# Patient Record
Sex: MALE | Race: WHITE | URBAN - METROPOLITAN AREA
[De-identification: names, ages, dates, MRNs, and addresses within clinical notes are randomized per-mention and may not be internally consistent; named-entity substitution may affect disease eponyms.]

---

## 2018-02-01 ENCOUNTER — HISTORICAL (OUTPATIENT)
Dept: ADMINISTRATIVE | Facility: HOSPITAL | Age: 68
End: 2018-02-01

## 2018-02-02 LAB — GRAM STN SPEC: NORMAL

## 2018-02-04 LAB — FINAL CULTURE: NORMAL

## 2018-03-05 LAB — FINAL CULTURE: NORMAL

## 2018-10-15 ENCOUNTER — HISTORICAL (OUTPATIENT)
Dept: ADMINISTRATIVE | Facility: HOSPITAL | Age: 68
End: 2018-10-15

## 2018-10-15 LAB
ABS NEUT (OLG): 5.39 X10(3)/MCL (ref 2.1–9.2)
ALBUMIN SERPL-MCNC: 2.8 GM/DL (ref 3.4–5)
ALBUMIN/GLOB SERPL: 1 RATIO (ref 1.1–2)
ALP SERPL-CCNC: 122 UNIT/L (ref 50–136)
ALT SERPL-CCNC: 36 UNIT/L (ref 12–78)
APTT PPP: 50.7 SECOND(S) (ref 24.8–36.9)
AST SERPL-CCNC: 18 UNIT/L (ref 15–37)
BASOPHILS # BLD AUTO: 0.1 X10(3)/MCL (ref 0–0.2)
BASOPHILS NFR BLD AUTO: 1 %
BILIRUB SERPL-MCNC: 0.7 MG/DL (ref 0.2–1)
BILIRUBIN DIRECT+TOT PNL SERPL-MCNC: 0.2 MG/DL (ref 0–0.5)
BILIRUBIN DIRECT+TOT PNL SERPL-MCNC: 0.5 MG/DL (ref 0–0.8)
BUN SERPL-MCNC: 36 MG/DL (ref 7–18)
CALCIUM SERPL-MCNC: 8 MG/DL (ref 8.5–10.1)
CHLORIDE SERPL-SCNC: 115 MMOL/L (ref 98–107)
CO2 SERPL-SCNC: 23 MMOL/L (ref 21–32)
CREAT SERPL-MCNC: 1.91 MG/DL (ref 0.7–1.3)
EOSINOPHIL # BLD AUTO: 0.4 X10(3)/MCL (ref 0–0.9)
EOSINOPHIL NFR BLD AUTO: 5 %
ERYTHROCYTE [DISTWIDTH] IN BLOOD BY AUTOMATED COUNT: 15.7 % (ref 11.5–17)
FERRITIN SERPL-MCNC: 403.7 NG/ML (ref 8–388)
GLOBULIN SER-MCNC: 2.9 GM/DL (ref 2.4–3.5)
GLUCOSE SERPL-MCNC: 237 MG/DL (ref 74–106)
HCT VFR BLD AUTO: 34.8 % (ref 42–52)
HGB BLD-MCNC: 10.9 GM/DL (ref 14–18)
INR PPP: 1.26 (ref 0–1.27)
LYMPHOCYTES # BLD AUTO: 1.8 X10(3)/MCL (ref 0.6–4.6)
LYMPHOCYTES NFR BLD AUTO: 21 %
MCH RBC QN AUTO: 27.2 PG (ref 27–31)
MCHC RBC AUTO-ENTMCNC: 31.3 GM/DL (ref 33–36)
MCV RBC AUTO: 86.8 FL (ref 80–94)
MONOCYTES # BLD AUTO: 1 X10(3)/MCL (ref 0.1–1.3)
MONOCYTES NFR BLD AUTO: 11 %
NEUTROPHILS # BLD AUTO: 5.39 X10(3)/MCL (ref 2.1–9.2)
NEUTROPHILS NFR BLD AUTO: 61 %
PLATELET # BLD AUTO: 161 X10(3)/MCL (ref 130–400)
PMV BLD AUTO: 10.8 FL (ref 9.4–12.4)
POTASSIUM SERPL-SCNC: 5.1 MMOL/L (ref 3.5–5.1)
PROT SERPL-MCNC: 5.7 GM/DL (ref 6.4–8.2)
PROTHROMBIN TIME: 16.2 SECOND(S) (ref 12.2–14.7)
RBC # BLD AUTO: 4.01 X10(6)/MCL (ref 4.7–6.1)
RET# (OHS): 0.05 X10^6/ML (ref 0.03–0.1)
RETICULOCYTE COUNT AUTOMATED (OLG): 1.3 % (ref 1.1–2.1)
SODIUM SERPL-SCNC: 143 MMOL/L (ref 136–145)
WBC # SPEC AUTO: 8.8 X10(3)/MCL (ref 4.5–11.5)

## 2019-02-11 ENCOUNTER — HOSPITAL ENCOUNTER (INPATIENT)
Facility: HOSPITAL | Age: 69
LOS: 4 days | Discharge: HOME HEALTH | DRG: 070 | End: 2019-02-15
Attending: PSYCHIATRY & NEUROLOGY | Admitting: PSYCHIATRY & NEUROLOGY
Payer: MEDICARE

## 2019-02-11 DIAGNOSIS — I10 HTN (HYPERTENSION): ICD-10-CM

## 2019-02-11 DIAGNOSIS — E11.65 TYPE 2 DIABETES MELLITUS WITH HYPERGLYCEMIA, WITHOUT LONG-TERM CURRENT USE OF INSULIN: Primary | ICD-10-CM

## 2019-02-11 DIAGNOSIS — R41.82 ALTERED MENTAL STATUS: ICD-10-CM

## 2019-02-11 PROBLEM — E11.9 DM (DIABETES MELLITUS): Status: ACTIVE | Noted: 2019-02-11

## 2019-02-11 PROBLEM — G93.40 ENCEPHALOPATHY ACUTE: Status: ACTIVE | Noted: 2019-02-11

## 2019-02-11 PROBLEM — T17.908A ASPIRATION INTO RESPIRATORY TRACT: Status: ACTIVE | Noted: 2019-02-11

## 2019-02-11 PROBLEM — I67.83 POSTERIOR REVERSIBLE ENCEPHALOPATHY SYNDROME: Status: ACTIVE | Noted: 2019-02-11

## 2019-02-11 PROBLEM — J96.01 ACUTE RESPIRATORY FAILURE WITH HYPOXIA: Status: ACTIVE | Noted: 2019-02-11

## 2019-02-11 PROBLEM — K72.90 LIVER FAILURE: Status: ACTIVE | Noted: 2019-02-11

## 2019-02-11 PROBLEM — G93.6 VASOGENIC BRAIN EDEMA: Status: ACTIVE | Noted: 2019-02-11

## 2019-02-11 LAB
ABO + RH BLD: NORMAL
ALBUMIN SERPL BCP-MCNC: 2.8 G/DL
ALLENS TEST: ABNORMAL
ALP SERPL-CCNC: 155 U/L
ALT SERPL W/O P-5'-P-CCNC: 28 U/L
AMMONIA PLAS-SCNC: 42 UMOL/L
ANION GAP SERPL CALC-SCNC: 12 MMOL/L
ANISOCYTOSIS BLD QL SMEAR: SLIGHT
ASCENDING AORTA: 2.69 CM
AST SERPL-CCNC: 33 U/L
AV INDEX (PROSTH): 0.99
AV MEAN GRADIENT: 2.73 MMHG
AV PEAK GRADIENT: 4.75 MMHG
AV VALVE AREA: 3.88 CM2
AV VELOCITY RATIO: 0.94
BACTERIA #/AREA URNS AUTO: ABNORMAL /HPF
BASOPHILS # BLD AUTO: 0.09 K/UL
BASOPHILS NFR BLD: 0.4 %
BILIRUB SERPL-MCNC: 1 MG/DL
BILIRUB UR QL STRIP: NEGATIVE
BLD GP AB SCN CELLS X3 SERPL QL: NORMAL
BSA FOR ECHO PROCEDURE: 2.02 M2
BUN SERPL-MCNC: 40 MG/DL
CALCIUM SERPL-MCNC: 8.8 MG/DL
CHLORIDE SERPL-SCNC: 110 MMOL/L
CHOLEST SERPL-MCNC: 105 MG/DL
CHOLEST/HDLC SERPL: 3.6 {RATIO}
CLARITY UR REFRACT.AUTO: ABNORMAL
CO2 SERPL-SCNC: 16 MMOL/L
COLOR UR AUTO: YELLOW
CREAT SERPL-MCNC: 2.1 MG/DL
CV ECHO LV RWT: 0.49 CM
DELSYS: ABNORMAL
DIFFERENTIAL METHOD: ABNORMAL
DOP CALC AO PEAK VEL: 1.09 M/S
DOP CALC AO VTI: 13.75 CM
DOP CALC LVOT AREA: 3.94 CM2
DOP CALC LVOT DIAMETER: 2.24 CM
DOP CALC LVOT PEAK VEL: 1.03 M/S
DOP CALC LVOT STROKE VOLUME: 53.41 CM3
DOP CALCLVOT PEAK VEL VTI: 13.56 CM
E WAVE DECELERATION TIME: 289.98 MSEC
E/A RATIO: 0.68
E/E' RATIO: 14.29
ECHO LV POSTERIOR WALL: 0.93 CM (ref 0.6–1.1)
EOSINOPHIL # BLD AUTO: 0 K/UL
EOSINOPHIL NFR BLD: 0.1 %
ERYTHROCYTE [DISTWIDTH] IN BLOOD BY AUTOMATED COUNT: 15.6 %
ERYTHROCYTE [SEDIMENTATION RATE] IN BLOOD BY WESTERGREN METHOD: 14 MM/H
EST. GFR  (AFRICAN AMERICAN): 36.8 ML/MIN/1.73 M^2
EST. GFR  (NON AFRICAN AMERICAN): 31.8 ML/MIN/1.73 M^2
ESTIMATED AVG GLUCOSE: 189 MG/DL
FIO2: 50
FRACTIONAL SHORTENING: 7 % (ref 28–44)
GLUCOSE SERPL-MCNC: 429 MG/DL
GLUCOSE UR QL STRIP: ABNORMAL
HBA1C MFR BLD HPLC: 8.2 %
HCO3 UR-SCNC: 19.3 MMOL/L (ref 24–28)
HCT VFR BLD AUTO: 42.4 %
HDLC SERPL-MCNC: 29 MG/DL
HDLC SERPL: 27.6 %
HGB BLD-MCNC: 14.2 G/DL
HGB UR QL STRIP: ABNORMAL
HYALINE CASTS UR QL AUTO: 0 /LPF
HYPOCHROMIA BLD QL SMEAR: ABNORMAL
IMM GRANULOCYTES # BLD AUTO: 0.8 K/UL
IMM GRANULOCYTES NFR BLD AUTO: 3.4 %
INTERVENTRICULAR SEPTUM: 0.76 CM (ref 0.6–1.1)
KETONES UR QL STRIP: NEGATIVE
LA MAJOR: 5.31 CM
LA MINOR: 5.21 CM
LA WIDTH: 4.02 CM
LDLC SERPL CALC-MCNC: 49 MG/DL
LEFT ATRIUM SIZE: 4.35 CM
LEFT ATRIUM VOLUME INDEX: 40 ML/M2
LEFT ATRIUM VOLUME: 78.18 CM3
LEFT INTERNAL DIMENSION IN SYSTOLE: 3.54 CM (ref 2.1–4)
LEFT VENTRICLE DIASTOLIC VOLUME INDEX: 31.86 ML/M2
LEFT VENTRICLE DIASTOLIC VOLUME: 62.31 ML
LEFT VENTRICLE MASS INDEX: 47.6 G/M2
LEFT VENTRICLE SYSTOLIC VOLUME INDEX: 26.6 ML/M2
LEFT VENTRICLE SYSTOLIC VOLUME: 52.12 ML
LEFT VENTRICULAR INTERNAL DIMENSION IN DIASTOLE: 3.81 CM (ref 3.5–6)
LEFT VENTRICULAR MASS: 93.01 G
LEUKOCYTE ESTERASE UR QL STRIP: NEGATIVE
LV LATERAL E/E' RATIO: 12.5
LV SEPTAL E/E' RATIO: 16.67
LYMPHOCYTES # BLD AUTO: 1.8 K/UL
LYMPHOCYTES NFR BLD: 7.6 %
MAGNESIUM SERPL-MCNC: 1.2 MG/DL
MCH RBC QN AUTO: 29.3 PG
MCHC RBC AUTO-ENTMCNC: 33.5 G/DL
MCV RBC AUTO: 87 FL
MICROSCOPIC COMMENT: ABNORMAL
MIN VOL: 10
MODE: ABNORMAL
MONOCYTES # BLD AUTO: 2.8 K/UL
MONOCYTES NFR BLD: 12.2 %
MV PEAK A VEL: 1.47 M/S
MV PEAK E VEL: 1 M/S
NEUTROPHILS # BLD AUTO: 17.7 K/UL
NEUTROPHILS NFR BLD: 76.3 %
NITRITE UR QL STRIP: NEGATIVE
NONHDLC SERPL-MCNC: 76 MG/DL
NRBC BLD-RTO: 0 /100 WBC
OVALOCYTES BLD QL SMEAR: ABNORMAL
PCO2 BLDA: 33.1 MMHG (ref 35–45)
PEEP: 5
PH SMN: 7.37 [PH] (ref 7.35–7.45)
PH UR STRIP: 5 [PH] (ref 5–8)
PIP: 21
PLATELET # BLD AUTO: 227 K/UL
PMV BLD AUTO: 12.5 FL
PO2 BLDA: 203 MMHG (ref 80–100)
POC BE: -6 MMOL/L
POC SATURATED O2: 100 % (ref 95–100)
POC TCO2: 20 MMOL/L (ref 23–27)
POCT GLUCOSE: 111 MG/DL (ref 70–110)
POCT GLUCOSE: 115 MG/DL (ref 70–110)
POCT GLUCOSE: 122 MG/DL (ref 70–110)
POCT GLUCOSE: 127 MG/DL (ref 70–110)
POCT GLUCOSE: 136 MG/DL (ref 70–110)
POCT GLUCOSE: 138 MG/DL (ref 70–110)
POCT GLUCOSE: 139 MG/DL (ref 70–110)
POCT GLUCOSE: 145 MG/DL (ref 70–110)
POCT GLUCOSE: 152 MG/DL (ref 70–110)
POCT GLUCOSE: 162 MG/DL (ref 70–110)
POCT GLUCOSE: 251 MG/DL (ref 70–110)
POCT GLUCOSE: 253 MG/DL (ref 70–110)
POCT GLUCOSE: 304 MG/DL (ref 70–110)
POCT GLUCOSE: 408 MG/DL (ref 70–110)
POCT GLUCOSE: 418 MG/DL (ref 70–110)
POCT GLUCOSE: 442 MG/DL (ref 70–110)
POCT GLUCOSE: 447 MG/DL (ref 70–110)
POCT GLUCOSE: 448 MG/DL (ref 70–110)
POCT GLUCOSE: 460 MG/DL (ref 70–110)
POIKILOCYTOSIS BLD QL SMEAR: SLIGHT
POLYCHROMASIA BLD QL SMEAR: ABNORMAL
POTASSIUM SERPL-SCNC: 4.6 MMOL/L
POTASSIUM SERPL-SCNC: 4.6 MMOL/L
PROT SERPL-MCNC: 6.1 G/DL
PROT UR QL STRIP: ABNORMAL
PULM VEIN S/D RATIO: 1.74
PV PEAK D VEL: 0.27 M/S
PV PEAK S VEL: 0.47 M/S
RA MAJOR: 5.07 CM
RA PRESSURE: 3 MMHG
RA WIDTH: 3.32 CM
RBC # BLD AUTO: 4.85 M/UL
RBC #/AREA URNS AUTO: 17 /HPF (ref 0–4)
RIGHT VENTRICULAR END-DIASTOLIC DIMENSION: 3.72 CM
RV TISSUE DOPPLER FREE WALL SYSTOLIC VELOCITY 1 (APICAL 4 CHAMBER VIEW): 8.62 M/S
SAMPLE: ABNORMAL
SINUS: 2.78 CM
SITE: ABNORMAL
SODIUM SERPL-SCNC: 138 MMOL/L
SP GR UR STRIP: >=1.03 (ref 1–1.03)
SP02: 100
SQUAMOUS #/AREA URNS AUTO: 1 /HPF
STJ: 2.64 CM
TDI LATERAL: 0.08
TDI SEPTAL: 0.06
TDI: 0.07
TRICUSPID ANNULAR PLANE SYSTOLIC EXCURSION: 1.07 CM
TRIGL SERPL-MCNC: 135 MG/DL
TROPONIN I SERPL DL<=0.01 NG/ML-MCNC: 0.1 NG/ML
TSH SERPL DL<=0.005 MIU/L-ACNC: 1.45 UIU/ML
URN SPEC COLLECT METH UR: ABNORMAL
VT: 500
WBC # BLD AUTO: 23.2 K/UL
WBC #/AREA URNS AUTO: 4 /HPF (ref 0–5)
YEAST UR QL AUTO: ABNORMAL

## 2019-02-11 PROCEDURE — 99900035 HC TECH TIME PER 15 MIN (STAT)

## 2019-02-11 PROCEDURE — A4216 STERILE WATER/SALINE, 10 ML: HCPCS | Performed by: NURSE PRACTITIONER

## 2019-02-11 PROCEDURE — 99291 PR CRITICAL CARE, E/M 30-74 MINUTES: ICD-10-PCS | Mod: GC,,, | Performed by: PSYCHIATRY & NEUROLOGY

## 2019-02-11 PROCEDURE — 80053 COMPREHEN METABOLIC PANEL: CPT

## 2019-02-11 PROCEDURE — 93010 ELECTROCARDIOGRAM REPORT: CPT | Mod: ,,, | Performed by: INTERNAL MEDICINE

## 2019-02-11 PROCEDURE — 93005 ELECTROCARDIOGRAM TRACING: CPT

## 2019-02-11 PROCEDURE — 81001 URINALYSIS AUTO W/SCOPE: CPT

## 2019-02-11 PROCEDURE — 80061 LIPID PANEL: CPT

## 2019-02-11 PROCEDURE — 83735 ASSAY OF MAGNESIUM: CPT

## 2019-02-11 PROCEDURE — 83036 HEMOGLOBIN GLYCOSYLATED A1C: CPT

## 2019-02-11 PROCEDURE — 86850 RBC ANTIBODY SCREEN: CPT

## 2019-02-11 PROCEDURE — 87077 CULTURE AEROBIC IDENTIFY: CPT

## 2019-02-11 PROCEDURE — 87070 CULTURE OTHR SPECIMN AEROBIC: CPT

## 2019-02-11 PROCEDURE — 99223 PR INITIAL HOSPITAL CARE,LEVL III: ICD-10-PCS | Mod: ,,, | Performed by: PSYCHIATRY & NEUROLOGY

## 2019-02-11 PROCEDURE — 85025 COMPLETE CBC W/AUTO DIFF WBC: CPT

## 2019-02-11 PROCEDURE — 99900026 HC AIRWAY MAINTENANCE (STAT)

## 2019-02-11 PROCEDURE — 27200966 HC CLOSED SUCTION SYSTEM

## 2019-02-11 PROCEDURE — 99223 1ST HOSP IP/OBS HIGH 75: CPT | Mod: ,,, | Performed by: PSYCHIATRY & NEUROLOGY

## 2019-02-11 PROCEDURE — 93010 EKG 12-LEAD: ICD-10-PCS | Mod: ,,, | Performed by: INTERNAL MEDICINE

## 2019-02-11 PROCEDURE — 87040 BLOOD CULTURE FOR BACTERIA: CPT | Mod: 59

## 2019-02-11 PROCEDURE — 84443 ASSAY THYROID STIM HORMONE: CPT

## 2019-02-11 PROCEDURE — 84484 ASSAY OF TROPONIN QUANT: CPT

## 2019-02-11 PROCEDURE — 82803 BLOOD GASES ANY COMBINATION: CPT

## 2019-02-11 PROCEDURE — 87186 SC STD MICRODIL/AGAR DIL: CPT

## 2019-02-11 PROCEDURE — 27000221 HC OXYGEN, UP TO 24 HOURS

## 2019-02-11 PROCEDURE — 36600 WITHDRAWAL OF ARTERIAL BLOOD: CPT

## 2019-02-11 PROCEDURE — 63600175 PHARM REV CODE 636 W HCPCS: Performed by: NURSE PRACTITIONER

## 2019-02-11 PROCEDURE — 94761 N-INVAS EAR/PLS OXIMETRY MLT: CPT

## 2019-02-11 PROCEDURE — 25000003 PHARM REV CODE 250: Performed by: NURSE PRACTITIONER

## 2019-02-11 PROCEDURE — 82140 ASSAY OF AMMONIA: CPT

## 2019-02-11 PROCEDURE — 25000003 PHARM REV CODE 250: Performed by: PHYSICIAN ASSISTANT

## 2019-02-11 PROCEDURE — 99291 CRITICAL CARE FIRST HOUR: CPT | Mod: GC,,, | Performed by: PSYCHIATRY & NEUROLOGY

## 2019-02-11 PROCEDURE — 94002 VENT MGMT INPAT INIT DAY: CPT

## 2019-02-11 PROCEDURE — 20000000 HC ICU ROOM

## 2019-02-11 PROCEDURE — 87205 SMEAR GRAM STAIN: CPT

## 2019-02-11 RX ORDER — AMOXICILLIN 250 MG
1 CAPSULE ORAL DAILY
Status: DISCONTINUED | OUTPATIENT
Start: 2019-02-11 | End: 2019-02-15 | Stop reason: HOSPADM

## 2019-02-11 RX ORDER — CARVEDILOL 12.5 MG/1
12.5 TABLET ORAL 2 TIMES DAILY
Status: ON HOLD | COMMUNITY
End: 2019-02-15 | Stop reason: HOSPADM

## 2019-02-11 RX ORDER — ALLOPURINOL 300 MG/1
300 TABLET ORAL DAILY
COMMUNITY

## 2019-02-11 RX ORDER — NICARDIPINE HYDROCHLORIDE 0.2 MG/ML
1 INJECTION INTRAVENOUS CONTINUOUS
Status: DISCONTINUED | OUTPATIENT
Start: 2019-02-11 | End: 2019-02-12

## 2019-02-11 RX ORDER — FAMOTIDINE 20 MG/1
20 TABLET, FILM COATED ORAL DAILY
Status: DISCONTINUED | OUTPATIENT
Start: 2019-02-11 | End: 2019-02-12

## 2019-02-11 RX ORDER — PROPOFOL 10 MG/ML
INJECTION, EMULSION INTRAVENOUS
Status: DISPENSED
Start: 2019-02-11 | End: 2019-02-11

## 2019-02-11 RX ORDER — CHLORHEXIDINE GLUCONATE ORAL RINSE 1.2 MG/ML
15 SOLUTION DENTAL 4 TIMES DAILY
Status: DISCONTINUED | OUTPATIENT
Start: 2019-02-11 | End: 2019-02-12

## 2019-02-11 RX ORDER — CALCITRIOL 0.25 UG/1
0.25 CAPSULE ORAL DAILY
COMMUNITY

## 2019-02-11 RX ORDER — ASPIRIN 325 MG
325 TABLET ORAL DAILY
Status: DISCONTINUED | OUTPATIENT
Start: 2019-02-12 | End: 2019-02-15 | Stop reason: HOSPADM

## 2019-02-11 RX ORDER — ASPIRIN 81 MG/1
81 TABLET ORAL DAILY
Status: ON HOLD | COMMUNITY
End: 2019-02-15 | Stop reason: HOSPADM

## 2019-02-11 RX ORDER — DEXMEDETOMIDINE HYDROCHLORIDE 4 UG/ML
0.2 INJECTION, SOLUTION INTRAVENOUS CONTINUOUS
Status: DISCONTINUED | OUTPATIENT
Start: 2019-02-11 | End: 2019-02-12

## 2019-02-11 RX ORDER — CYCLOBENZAPRINE HCL 10 MG
10 TABLET ORAL 3 TIMES DAILY PRN
COMMUNITY

## 2019-02-11 RX ORDER — FLUTICASONE PROPIONATE 50 MCG
1 SPRAY, SUSPENSION (ML) NASAL DAILY
COMMUNITY

## 2019-02-11 RX ORDER — SIMVASTATIN 20 MG/1
20 TABLET, FILM COATED ORAL DAILY
Status: ON HOLD | COMMUNITY
End: 2019-02-15 | Stop reason: SDUPTHER

## 2019-02-11 RX ORDER — ONDANSETRON 2 MG/ML
4 INJECTION INTRAMUSCULAR; INTRAVENOUS EVERY 8 HOURS PRN
Status: DISCONTINUED | OUTPATIENT
Start: 2019-02-11 | End: 2019-02-15 | Stop reason: HOSPADM

## 2019-02-11 RX ORDER — PIOGLITAZONE AND GLIMEPIRIDE 30; 4 MG/1; MG/1
1 TABLET ORAL 2 TIMES DAILY
COMMUNITY

## 2019-02-11 RX ORDER — SODIUM CHLORIDE 9 MG/ML
INJECTION, SOLUTION INTRAVENOUS CONTINUOUS
Status: DISCONTINUED | OUTPATIENT
Start: 2019-02-11 | End: 2019-02-13

## 2019-02-11 RX ORDER — VANCOMYCIN HCL IN 5 % DEXTROSE 1.25 G/25
15 PLASTIC BAG, INJECTION (ML) INTRAVENOUS
Status: DISCONTINUED | OUTPATIENT
Start: 2019-02-11 | End: 2019-02-12

## 2019-02-11 RX ORDER — NICARDIPINE HYDROCHLORIDE 0.2 MG/ML
INJECTION INTRAVENOUS
Status: DISPENSED
Start: 2019-02-11 | End: 2019-02-11

## 2019-02-11 RX ORDER — ATORVASTATIN CALCIUM 20 MG/1
40 TABLET, FILM COATED ORAL DAILY
Status: DISCONTINUED | OUTPATIENT
Start: 2019-02-11 | End: 2019-02-15 | Stop reason: HOSPADM

## 2019-02-11 RX ORDER — SODIUM CHLORIDE 0.9 % (FLUSH) 0.9 %
3 SYRINGE (ML) INJECTION EVERY 8 HOURS
Status: DISCONTINUED | OUTPATIENT
Start: 2019-02-11 | End: 2019-02-15 | Stop reason: HOSPADM

## 2019-02-11 RX ORDER — CLONIDINE HYDROCHLORIDE 0.2 MG/1
0.2 TABLET ORAL 2 TIMES DAILY
Status: ON HOLD | COMMUNITY
End: 2019-02-15 | Stop reason: HOSPADM

## 2019-02-11 RX ORDER — PROPOFOL 10 MG/ML
5 INJECTION, EMULSION INTRAVENOUS CONTINUOUS
Status: DISCONTINUED | OUTPATIENT
Start: 2019-02-11 | End: 2019-02-11

## 2019-02-11 RX ORDER — SODIUM BICARBONATE 650 MG/1
650 TABLET ORAL 3 TIMES DAILY
COMMUNITY

## 2019-02-11 RX ADMIN — Medication 1250 MG: at 06:02

## 2019-02-11 RX ADMIN — SODIUM CHLORIDE 4 UNITS/HR: 9 INJECTION, SOLUTION INTRAVENOUS at 08:02

## 2019-02-11 RX ADMIN — SODIUM CHLORIDE: 0.9 INJECTION, SOLUTION INTRAVENOUS at 06:02

## 2019-02-11 RX ADMIN — CHLORHEXIDINE GLUCONATE 0.12% ORAL RINSE 15 ML: 1.2 LIQUID ORAL at 02:02

## 2019-02-11 RX ADMIN — SODIUM CHLORIDE: 0.9 INJECTION, SOLUTION INTRAVENOUS at 05:02

## 2019-02-11 RX ADMIN — CHLORHEXIDINE GLUCONATE 0.12% ORAL RINSE 15 ML: 1.2 LIQUID ORAL at 09:02

## 2019-02-11 RX ADMIN — PROPOFOL 40 MCG/KG/MIN: 10 INJECTION, EMULSION INTRAVENOUS at 08:02

## 2019-02-11 RX ADMIN — PIPERACILLIN AND TAZOBACTAM 4.5 G: 4; .5 INJECTION, POWDER, LYOPHILIZED, FOR SOLUTION INTRAVENOUS; PARENTERAL at 10:02

## 2019-02-11 RX ADMIN — CHLORHEXIDINE GLUCONATE 0.12% ORAL RINSE 15 ML: 1.2 LIQUID ORAL at 05:02

## 2019-02-11 RX ADMIN — DEXMEDETOMIDINE HYDROCHLORIDE 0.2 MCG/KG/HR: 100 INJECTION, SOLUTION, CONCENTRATE INTRAVENOUS at 11:02

## 2019-02-11 RX ADMIN — CHLORHEXIDINE GLUCONATE 0.12% ORAL RINSE 15 ML: 1.2 LIQUID ORAL at 06:02

## 2019-02-11 RX ADMIN — PIPERACILLIN AND TAZOBACTAM 4.5 G: 4; .5 INJECTION, POWDER, LYOPHILIZED, FOR SOLUTION INTRAVENOUS; PARENTERAL at 05:02

## 2019-02-11 RX ADMIN — PROPOFOL 20 MCG/KG/MIN: 10 INJECTION, EMULSION INTRAVENOUS at 06:02

## 2019-02-11 RX ADMIN — DEXMEDETOMIDINE HYDROCHLORIDE 0.6 MCG/KG/HR: 100 INJECTION, SOLUTION, CONCENTRATE INTRAVENOUS at 02:02

## 2019-02-11 RX ADMIN — Medication 3 ML: at 06:02

## 2019-02-11 NOTE — SUBJECTIVE & OBJECTIVE
Past Medical History:   Diagnosis Date    Diabetes mellitus type 2 in obese     HTN (hypertension), benign     Renal disease      History reviewed. No pertinent surgical history.  History reviewed. No pertinent family history.  Social History     Tobacco Use    Smoking status: Not on file   Substance Use Topics    Alcohol use: Not on file    Drug use: Not on file     Review of patient's allergies indicates:   Allergen Reactions    Codeine Other (See Comments)       Medications: I have reviewed the current medication administration record.    No medications prior to admission.       Review of Systems   Unable to perform ROS: Acuity of condition     Objective:     Vital Signs (Most Recent):  Temp: 99.5 °F (37.5 °C) (02/11/19 0503)  Pulse: (!) 125 (02/11/19 0618)  Resp: 20 (02/11/19 0618)  BP: (!) 190/83 (02/11/19 0618)  SpO2: 100 % (02/11/19 0618)    Vital Signs Range (Last 24H):  Temp:  [99.2 °F (37.3 °C)-99.5 °F (37.5 °C)]   Pulse:  []   Resp:  [20-24]   BP: (151-190)/(73-91)   SpO2:  [100 %]     Physical Exam   Constitutional: He appears well-developed and well-nourished.   HENT:   Head: Normocephalic and atraumatic.   Pulmonary/Chest:   Intubated on vent   Neurological:   Obtunded due to sedation  Moves right side side  Left side to painful stimuli   Nursing note and vitals reviewed.      Neurological Exam:   LOC: obtunded  Attention Span: poor  Language: Intubated  Articulation: Untestable due to intubation  Orientation: Untestable due to intubation  Visual Fields: Full  EOM (CN III, IV, VI): Gaze preference  right  Pupils (CN II, III): PERRL  Facial Sensation (CN V): Normal  Facial Movement (CN VII): Symmetric facial expression    Gag Reflex: present  Reflexes: flexor plantar responses bilaterally  Motor: moves right left to painful  Cebellar: No evidence of appendicular or axial ataxia  Sensation: Intact to light touch, temperature and vibration  Tone: Flaccid  LUE , LLE, RUE and RLE        Laboratory:  CMP: No results for input(s): GLUCOSE, CALCIUM, ALBUMIN, PROT, NA, K, CO2, CL, BUN, CREATININE, ALKPHOS, ALT, AST, BILITOT in the last 24 hours.  CBC: No results for input(s): WBC, RBC, HGB, HCT, PLT, MCV, MCH, MCHC in the last 168 hours.  Lipid Panel: No results for input(s): CHOL, LDLCALC, HDL, TRIG in the last 168 hours.  Coagulation: No results for input(s): PT, INR, APTT in the last 168 hours.  Hgb A1C: No results for input(s): HGBA1C in the last 168 hours.  TSH: No results for input(s): TSH in the last 168 hours.    Diagnostic Results:      Brain imaging:      Vessel Imaging:      Cardiac Evaluation:

## 2019-02-11 NOTE — PLAN OF CARE
02/11/19 1533   Discharge Assessment   Assessment Type Discharge Planning Assessment   Confirmed/corrected address and phone number on facesheet? Yes   Assessment information obtained from? Caregiver  (jonathan)   Expected Length of Stay (days) 7   Communicated expected length of stay with patient/caregiver yes   Prior to hospitilization cognitive status: Alert/Oriented   Prior to hospitalization functional status: Independent   Current cognitive status: Coma/Sedated/Intubated   Current Functional Status: Completely Dependent   Facility Arrived From: [facility]   Lives With child(ran), adult  (jonathan, Randal and Ernesto)   Able to Return to Prior Arrangements other (see comments)  (bill)   Is patient able to care for self after discharge? Unable to determine at this time (comments)   Who are your caregiver(s) and their phone number(s)? Randal Leach  (son)  [phone]. Ernesto Leach (son) [phone]   Readmission Within the Last 30 Days no previous admission in last 30 days   Patient currently being followed by outpatient case management? No   Patient currently receives any other outside agency services? No   Equipment Currently Used at Home cane, straight  (occasionally)   Do you have any problems affording any of your prescribed medications? No   Is the patient taking medications as prescribed? yes   Does the patient have transportation home? Yes   Transportation Anticipated family or friend will provide   Does the patient receive services at the [facility]? No   Discharge Plan A Rehab   Discharge Plan B Skilled Nursing Facility   DME Needed Upon Discharge  other (see comments)  (tb d)   Patient/Family in Agreement with Plan yes         Discharge/ My Health Packet Folder Given to patient/family:      yes      PCP:  Lucian Vásquez Iii, MD  [address] [address] / [place] [address]    Pharmacy:    [id number] - [place], [place] - [address]  [address]  [place] [address]  Phone: [phone]  Fax: [phone]        Emergency Contacts:  Extended Emergency Contact Information  Primary Emergency Contact: Haylee Randal  Address: [address], [place] [id number] [place] [address]  Home Phone: [phone]  Mobile Phone: [phone]  Relation: Son  Secondary Emergency Contact: Ernesto Leach  Mobile Phone: [phone]  Relation: Son      Insurance:  Payor: MEDICARE / Plan: MEDICARE PART A & B / Product Type: [employer] /     Marilu Leach RN, CCRN-K, [facility]  Neuro-Critical Care   X [phone]

## 2019-02-11 NOTE — H&P
[facility]  Vascular Neurology  Comprehensive Stroke Center  History & Physical    Inpatient consult to Vascular (Stroke) Neurology  Consult performed by: Barbara Louie NP  Consult ordered by: Souleymane Najera NP  Reason for consult: AMS possible stroke        Assessment/Plan:     Patient is a 68 y.o. year old male with:    * Altered mental status    69 y/o male with AMS and hypertensive urgency with LSW and right gaze with possible R MCA stroke    Antithrombotics:  mg daily    Statin: Lipitor 40 mg daily    Therapy: PT/OT/ST    Diagnostics: MRI brain, 2D Echo, lipid panel, HgB A1C, TSH    Risk factor Modifications: HTN, DM, renal disease    SBP: possible infarct no intervention <220     Essential hypertension    Stroke risk factor  SBP <220     DM (diabetes mellitus)    Stroke risk factor  HgB A1C  SSI         STROKE DOCUMENTATION          NIH Scale:  1a. Level of Consciousness: 2-->Not alert, requires repeated stimulation to attend, or is obtunded and requires strong or painful stimulation to make movements (not stereotyped)  1b. LOC Questions: 2-->Answers neither question correctly  1c. LOC Commands: 1-->Performs one task correctly  2. Best Gaze: 2-->Forced deviation, or total gaze paresis not overcome by the oculocephalic maneuver  3. Visual: 0-->No visual loss  4. Facial Palsy: 0-->Normal symmetrical movements  5a. Motor Arm, Left: 4-->No movement  5b. Motor Arm, Right: 2-->Some effort against gravity, limb cannot get to or maintain (if cued) 90 (or 45) degrees, drifts down to bed, but has some effort against gravity  6a. Motor Leg, Left: 4-->No movement  6b. Motor Leg, Right: 2-->Some effort against gravity, leg falls to bed by 5 secs, but has some effort against gravity  7. Limb Ataxia: 0-->Absent  8. Sensory: 0-->Normal, no sensory loss  9. Best Language: 0-->No aphasia, normal  10. Dysarthria: (UN) Intubated or other physical barrier  11. Extinction and Inattention (formerly  Neglect): 0-->No abnormality  Total (NIH Stroke Scale): 19     Modified McCook Score: 0  Pomeroy Coma Scale:9   ABCD2 Score:    [id number] Score:   HAS -BLED Score:   ICH Score:   Hunt & Crowder Classification:      Thrombolysis Candidate? No, Strong suspicion for stroke mimic or alternative diagnosis , patient seen at [facility] and not given      Interventional Revascularization Candidate?   Is the patient eligible for mechanical endovascular reperfusion (KIRSTEN)?  No; No large vessel occlusion    Hemorrhagic change of an Ischemic Stroke: Does this patient have an ischemic stroke with hemorrhagic changes? No         Subjective:     History of Present Illness:  67 y/o male who was in usual health when all of a sudden around 2130 on 2-10-19 when all of a sudden he called out that he was not feeling well and needed to vomit then became weak, vomited and had left sided weakness, EMS called and on the way to [facility] he stopped breathing and was intubated immediatly upon arrival at ED . Patient was extremely hypertensive on arrival to ED. Dr Cifuentes was contacted and advised to do CTA head and neck and with no LVO seen Dr Cifuentes recommended getting in touch with Dr Johnson in [facility] who accepted as transfer.   Patient on Cardene and propofolol.        Past Medical History:   Diagnosis Date    Diabetes mellitus type 2 in obese     HTN (hypertension), benign     Renal disease      History reviewed. No pertinent surgical history.  History reviewed. No pertinent family history.  Social History     Tobacco Use    Smoking status: Not on file   Substance Use Topics    Alcohol use: Not on file    Drug use: Not on file     Review of patient's allergies indicates:   Allergen Reactions    Codeine Other (See Comments)       Medications: I have reviewed the current medication administration record.    No medications prior to admission.       Review of Systems   Unable to perform ROS: Acuity of condition      Objective:     Vital Signs (Most Recent):  Temp: 99.5 °F (37.5 °C) (02/11/19 0503)  Pulse: (!) 125 (02/11/19 0618)  Resp: 20 (02/11/19 0618)  BP: (!) 190/83 (02/11/19 0618)  SpO2: 100 % (02/11/19 0618)    Vital Signs Range (Last 24H):  Temp:  [99.2 °F (37.3 °C)-99.5 °F (37.5 °C)]   Pulse:  []   Resp:  [20-24]   BP: (151-190)/(73-91)   SpO2:  [100 %]     Physical Exam   Constitutional: He appears well-developed and well-nourished.   HENT:   Head: Normocephalic and atraumatic.   Pulmonary/Chest:   Intubated on vent   Neurological:   Obtunded due to sedation  Moves right side side  Left side to painful stimuli   Nursing note and vitals reviewed.      Neurological Exam:   LOC: obtunded  Attention Span: poor  Language: Intubated  Articulation: Untestable due to intubation  Orientation: Untestable due to intubation  Visual Fields: Full  EOM (CN III, IV, VI): Gaze preference  right  Pupils (CN II, III): PERRL  Facial Sensation (CN V): Normal  Facial Movement (CN VII): Symmetric facial expression    Gag Reflex: present  Reflexes: flexor plantar responses bilaterally  Motor: moves right left to painful  Cebellar: No evidence of appendicular or axial ataxia  Sensation: Intact to light touch, temperature and vibration  Tone: Flaccid  LUE , LLE, RUE and RLE       Laboratory:  CMP: No results for input(s): GLUCOSE, CALCIUM, ALBUMIN, PROT, NA, K, CO2, CL, BUN, CREATININE, ALKPHOS, ALT, AST, BILITOT in the last 24 hours.  CBC: No results for input(s): WBC, RBC, HGB, HCT, PLT, MCV, MCH, MCHC in the last 168 hours.  Lipid Panel: No results for input(s): CHOL, LDLCALC, HDL, TRIG in the last 168 hours.  Coagulation: No results for input(s): PT, INR, APTT in the last 168 hours.  Hgb A1C: No results for input(s): HGBA1C in the last 168 hours.  TSH: No results for input(s): TSH in the last 168 hours.    Diagnostic Results:      Brain imaging:      Vessel Imaging:      Cardiac Evaluation:           Barbara Louie,  NP  Comprehensive Stroke Center  Department of Vascular Neurology   [facility]

## 2019-02-11 NOTE — PLAN OF CARE
Problem: Adult Inpatient Plan of Care  Goal: Plan of Care Review  Outcome: Ongoing (interventions implemented as appropriate)    Recommendations     1. TF recommendations: Glucerna 1.5 @ 50 mL/hr to provide 1800 calories, 99 grams of protein, 911 mL fluid.               - Hold for residuals >500 mL; additional fluid per MD.  2. If able to extubate & advance diet, recommend 2000 kcal ADA diet (texture per SLP).   3. RD to monitor & follow-up.

## 2019-02-11 NOTE — HPI
67 y/o male who was in usual health when all of a sudden around 2130 on 2-10-19 when all of a sudden he called out that he was not feeling well and needed to vomit then became weak, vomited and had left sided weakness, EMS called and on the way to [facility] he stopped breathing and was intubated immediatly upon arrival at ED . Patient was extremely hypertensive on arrival to ED. Dr Cifuentes was contacted and advised to do CTA head and neck and with no LVO seen Dr Cifuentes recommended getting in touch with Dr Johnson in [facility] who accepted as transfer.   Patient on Cardene and propofolol.

## 2019-02-11 NOTE — ASSESSMENT & PLAN NOTE
- VN consulted   - MRI pending   - SBP <180   - Q 1 vitals   - Q 1 neuro checks   - PT/Ot/SP   - ammonia pending   - Atorvastatin daily   - Lipid panel, TSH, a1c  - aspirin daily

## 2019-02-11 NOTE — PT/OT/SLP PROGRESS
Speech Language Pathology  Discharge Summary      Michele Leach  MRN: [id number]    Patient not seen today secondary to patient intubated. ST to await new orders once patient is extubated and medically appropriate.     Emily Abadie, TAMIKA-SLP

## 2019-02-11 NOTE — NURSING
[facility]. Hooked to monitor. HR 120s T 99.5 SBP at 150s 02 sat 100%, sedated with propofol and on cardene drip . Intubated at size 8.0 on mech vent at 50% Fi02. With OGT and Foleys. Relatives oriented to ICU set up,

## 2019-02-11 NOTE — SUBJECTIVE & OBJECTIVE
Interval History:  Admit NCC   Review of Systems    Unable to obtain a complete ROS due to level of consciousness.  Objective:     Vitals:  Temp: 99.5 °F (37.5 °C)  Pulse: (!) 120  Rhythm: sinus tachycardia  BP: (!) 151/73  Resp: 20  SpO2: 100 %  O2 Device (Oxygen Therapy): ventilator  Vent Mode: A/C  Set Rate: 14 bmp  Vt Set: 14 mL  PEEP/CPAP: 5 cmH20  Peak Airway Pressure: 21 cmH2O  Mean Airway Pressure: 11 cmH20    Temp  Min: 99.2 °F (37.3 °C)  Max: 99.5 °F (37.5 °C)  Pulse  Min: 98  Max: 120  BP  Min: 151/73  Max: 180/91  Resp  Min: 20  Max: 24  SpO2  Min: 100 %  Max: 100 %    02/10 0701 - 02/11 0700  In: -   Out: 300 [Urine:300]           Physical Exam      Physical Exam:  GA: sedated, comfortable, no acute distress.   HEENT: No scleral icterus or JVD.   Pulmonary: Clear to auscultation A/L.   Cardiac: RRR S1 & S2 w/o rubs/murmurs/gallops.   Abdominal: Bowel sounds present x 4.   Skin: No jaundice, rashes, or visible lesions.  Neuro:  --GCS: E3 V1t M5  --Mental Status:  Sedated on propofol, sedation turned off pt arouses to tactile stimuli and localizes but will not follow commands   --R eye blind L pupil reactive 3   --Corneal reflex, gag, cough intact.  --Moves L upper and lower spont R upper TF and R lower withdraws     Unable to test orientation, language, memory, judgment, insight, fund of knowledge, shoulder shrug, tongue protrusion, coordination, gait due to level of consciousness.    Medications:  Continuous  sodium chloride 0.9% Last Rate: 75 mL/hr at 02/11/19 0603   nicardipine    propofol Last Rate: 20 mcg/kg/min (02/11/19 0601)   Scheduled  [START ON 2/12/2019] aspirin 325 mg Daily   atorvastatin 40 mg Daily   chlorhexidine 15 mL QID   famotidine 20 mg Daily   piperacillin-tazobactam 4.5 g in sodium chloride 0.9% 100 mL IVPB (ready to mix system) 4.5 g Q8H   sodium chloride 0.9% 3 mL Q8H   vancomycin (VANCOCIN) IVPB 15 mg/kg (Dosing Weight) Q24H   PRN  ondansetron 4 mg Q8H PRN     Today I personally  reviewed pertinent medications, lines/drains/airways, imaging, cardiology results, laboratory results,     Diet  Diet NPO  Diet NPO

## 2019-02-11 NOTE — HPI
Mr Leach is a 67 yo male with a PMH of Liver disease, DM, CAD, HTN, R eye blindness, and facto VIII defecit who presents to [facility] for AMS. He was at home yesterday when he stood up to go to the bathroom and on the way vomited and became confused. EMS arrived to find the pt confused with SBP >200. He had a R gaze and L sided weakness. . CTA at OSH showed no flow limiting stenosis or LVO. He was unresponsive upon arrival to OSH and was intubated in ED. He is being admitted to [facility] for a higher level of care.

## 2019-02-11 NOTE — HOSPITAL COURSE
2/11: Admit NCC   2/12: extubation, off insulin gtt  2/13: Tolerating RA, narrow ABX, TTF under HM   2/14: HTN overnight, oral meds adjusted. TTF under HM

## 2019-02-11 NOTE — ASSESSMENT & PLAN NOTE
67 y/o male with AMS and hypertensive urgency with LSW and right gaze with possible R MCA stroke    Antithrombotics:  mg daily    Statin: Lipitor 40 mg daily    Therapy: PT/OT/ST    Diagnostics: MRI brain, 2D Echo, lipid panel, HgB A1C, TSH    Risk factor Modifications: HTN, DM, renal disease    SBP: possible infarct no intervention <220

## 2019-02-11 NOTE — H&P
[facility]  Neurocritical Care  H&P     Admit Date: 2/11/2019  Service Date: 02/11/2019  Length of Stay: 0    Subjective:     Chief Complaint: Altered mental status    History of Present Illness: Mr Leach is a 67 yo male with a PMH of Liver disease, DM, CAD, HTN, R eye blindness, and facto VIII defecit who presents to [facility] for AMS. He was at home yesterday when he stood up to go to the bathroom and on the way vomited and became confused. EMS arrived to find the pt confused with SBP >200. He had a R gaze and L sided weakness. . CTA at OSH showed no flow limiting stenosis or LVO. He was unresponsive upon arrival to OSH and was intubated in ED. He is being admitted to [facility] for a higher level of care.     Hospital Course: 2/11: Admit NCC     Interval History:  Admit NCC   Review of Systems    Unable to obtain a complete ROS due to level of consciousness.  Objective:     Vitals:  Temp: 99.5 °F (37.5 °C)  Pulse: (!) 120  Rhythm: sinus tachycardia  BP: (!) 151/73  Resp: 20  SpO2: 100 %  O2 Device (Oxygen Therapy): ventilator  Vent Mode: A/C  Set Rate: 14 bmp  Vt Set: 14 mL  PEEP/CPAP: 5 cmH20  Peak Airway Pressure: 21 cmH2O  Mean Airway Pressure: 11 cmH20    Temp  Min: 99.2 °F (37.3 °C)  Max: 99.5 °F (37.5 °C)  Pulse  Min: 98  Max: 120  BP  Min: 151/73  Max: 180/91  Resp  Min: 20  Max: 24  SpO2  Min: 100 %  Max: 100 %    02/10 0701 - 02/11 0700  In: -   Out: 300 [Urine:300]           Physical Exam      Physical Exam:  GA: sedated, comfortable, no acute distress.   HEENT: No scleral icterus or JVD.   Pulmonary: Clear to auscultation A/L.   Cardiac: RRR S1 & S2 w/o rubs/murmurs/gallops.   Abdominal: Bowel sounds present x 4.   Skin: No jaundice, rashes, or visible lesions.  Neuro:  --GCS: E3 V1t M5  --Mental Status:  Sedated on propofol, sedation turned off pt arouses to tactile stimuli and localizes but will not follow commands   --R eye blind L pupil reactive 3   --Corneal reflex, gag, cough  intact.  --Moves L upper and lower spont R upper TF and R lower withdraws     Unable to test orientation, language, memory, judgment, insight, fund of knowledge, shoulder shrug, tongue protrusion, coordination, gait due to level of consciousness.    Medications:  Continuous  sodium chloride 0.9% Last Rate: 75 mL/hr at 02/11/19 0603   nicardipine    propofol Last Rate: 20 mcg/kg/min (02/11/19 0601)   Scheduled  [START ON 2/12/2019] aspirin 325 mg Daily   atorvastatin 40 mg Daily   chlorhexidine 15 mL QID   famotidine 20 mg Daily   piperacillin-tazobactam 4.5 g in sodium chloride 0.9% 100 mL IVPB (ready to mix system) 4.5 g Q8H   sodium chloride 0.9% 3 mL Q8H   vancomycin (VANCOCIN) IVPB 15 mg/kg (Dosing Weight) Q24H   PRN  ondansetron 4 mg Q8H PRN     Today I personally reviewed pertinent medications, lines/drains/airways, imaging, cardiology results, laboratory results,     Diet  Diet NPO  Diet NPO        Assessment/Plan:     Neuro   * Altered mental status    - VN consulted   - MRI pending   - SBP <180   - Q 1 vitals   - Q 1 neuro checks   - PT/Ot/SP   - ammonia pending   - Atorvastatin daily   - Lipid panel, TSH, a1c  - aspirin daily      Pulmonary   Aspiration into respiratory tract    - Report of aspiration prior to intubation   - Broad spec antibiotics   - daily cxr     Acute respiratory failure with hypoxia    - intubated at OSH   - Mechanical ventilation   - CXR and ABG daily   - VAP      Cardiac/Vascular   Essential hypertension    - echo and EKG  - Trop  - SBP < 180   - Nicardipine gtt as needed      Endocrine   DM (diabetes mellitus)    - A1c pending   - insulin infusion for CBG >400     GI   Liver failure    - Monitor LFT  - ammonia pending              The patient is being Prophylaxed for:  Venous Thromboembolism with: Mechanical  Stress Ulcer with: H2B  Ventilator Pneumonia with: chlorhexidine oral care    Activity Orders          None        Full Code    Souleymane Najera NP  Neurocritical  Care [facility]

## 2019-02-11 NOTE — CONSULTS
"  [facility]  Adult Nutrition  Consult Note    SUMMARY     Recommendations    1. TF recommendations: Glucerna 1.5 @ 50 mL/hr to provide 1800 calories, 99 grams of protein, 911 mL fluid.    - Hold for residuals >500 mL; additional fluid per MD.  2. If able to extubate & advance diet, recommend 2000 kcal ADA diet (texture per SLP).   3. RD to monitor & follow-up.    Goals: Meet % EEN, EPN  Nutrition Goal Status: new  Communication of RD Recs: reviewed with RN    Reason for Assessment    Reason For Assessment: consult  Diagnosis: other (see comments)(AMS)  Relevant Medical History: HTN, DM  Interdisciplinary Rounds: did not attend    General Information Comments: Pt intubated, sedated. Per pt's family member at bedside, pt w/ good appetite PTA. States pt weighed 300 pounds 2-3 years ago, but has been maintaining his weight around 200# x 1 year. NFPE complete, pt w/ no physical signs of malnutrition. Will continue to monitor.  Nutrition Discharge Planning: Unable to determine    Nutrition/Diet History    Patient Reported Diet/Restrictions/Preferences: other (see comments)(RAMON)  Spiritual, Cultural Beliefs, Pent[employer]al Practices, Values that Affect Care: no  Factors Affecting Nutritional Intake: NPO, on mechanical ventilation    Anthropometrics    Temp: 97.9 °F (36.6 °C)  Height: 5' 4" (162.6 cm)  Height (inches): 64 in  Weight Method: Bed Scale  Weight: 90.6 kg (199 lb 11.8 oz)  Weight (lb): 199.74 lb  Ideal Body Weight (IBW), Male: 130 lb  % Ideal Body Weight, Male (lb): 153.65 lb  BMI (Calculated): 34.4  BMI Grade: 30 - 34.9- obesity - grade I    Lab/Procedures/Meds    Pertinent Labs Reviewed: reviewed  Pertinent Labs Comments: BUN 40, Creat 2.1, GFR 36.8, Gluc 429, A1C 8.2  Pertinent Medications Reviewed: reviewed  Pertinent Medications Comments: Insulin    Estimated/Assessed Needs    Weight Used For Calorie Calculations: 91.1 kg (200 lb 13.4 oz)(Dosing wt)     Energy Calorie Requirements " (kcal): 1867 kcal/d  Energy Need Method: [facility]     Protein Requirements:  g/d (1.5-2 g/kg IBW)  Weight Used For Protein Calculations: 59 kg (130 lb 1.1 oz)     Estimated Fluid Requirement Method: other (see comments)(Per MD or 1 mL/kcal)     CHO Requirement: 50% total kcals    Nutrition Prescription Ordered    Current Diet Order: NPO    Evaluation of Received Nutrient/Fluid Intake    Comments: LBM: not recorded    Nutrition Risk    Level of Risk/Frequency of Follow-up: (2x/week)     Assessment and Plan    Nutrition Problem  Inadequate energy intake    Related to (etiology):   Inability to consume sufficient energy    Signs and Symptoms (as evidenced by):   NPO with no alternate means of nutrition     Nutrition Diagnosis Status:   New     Monitor and Evaluation    Food and Nutrient Intake: energy intake, food and beverage intake, enteral nutrition intake  Food and Nutrient Adminstration: diet order, enteral and parenteral nutrition administration  Physical Activity and Function: nutrition-related ADLs and IADLs  Anthropometric Measurements: weight, weight change  Biochemical Data, Medical Tests and Procedures: lipid profile, inflammatory profile, glucose/endocrine profile, electrolyte and renal panel, gastrointestinal profile  Nutrition-Focused Physical Findings: overall appearance     Nutrition Follow-Up    RD Follow-up?: Yes

## 2019-02-11 NOTE — PLAN OF CARE
Problem: Adult Inpatient Plan of Care  Goal: Plan of Care Review  Outcome: Ongoing (interventions implemented as appropriate)    No acute events throughout day. See vital signs and assessments in flowsheets. See below for updates on today's progress.     Pulmonary: -ET size 8.0 at 23 teeth line at AC mode Fi02 40% PEEP 5 RR 14  -saturating well at %    Cardiovascular: -Sinus tachy at 120s -SBP at 150-160s -With a goal SBP<180 (with standby Cardene gtt)     Neurological: -Restless upon pausing of sedation -Obeys command -Left pupil 2mm brisk equal -Right eye blind -Withdraws to pain while on sedation    Gastrointestinal: -OGT at 65 -NPO    Genitourinary: -On foleys drained 300ml upon arrival -Sent urine routine test sample    Endocrine: -BG at 400'smg/dl -To start Insulin gtt    Integumentary/Other: -PIV x 3 -Noted skin ecchymoses on upper extremities with both forearm open skin tears (covered with gauze) -Placed protective foams over heels and sacrum    Infusions: -Plain Saline -Propofol    Patient progressing towards goals as tolerated, plan of care communicated and reviewed with Michele Leach and family. All concerns addressed. Will continue to monitor.

## 2019-02-12 PROBLEM — J96.01 ACUTE RESPIRATORY FAILURE WITH HYPOXIA: Status: RESOLVED | Noted: 2019-02-11 | Resolved: 2019-02-12

## 2019-02-12 PROBLEM — N18.4 STAGE 4 CHRONIC KIDNEY DISEASE: Status: ACTIVE | Noted: 2019-02-12

## 2019-02-12 LAB
ALBUMIN SERPL BCP-MCNC: 2.3 G/DL
ALLENS TEST: ABNORMAL
ALP SERPL-CCNC: 99 U/L
ALT SERPL W/O P-5'-P-CCNC: 19 U/L
ANION GAP SERPL CALC-SCNC: 11 MMOL/L
ANISOCYTOSIS BLD QL SMEAR: SLIGHT
AST SERPL-CCNC: 23 U/L
BASOPHILS # BLD AUTO: 0.12 K/UL
BASOPHILS NFR BLD: 0.5 %
BILIRUB SERPL-MCNC: 1.4 MG/DL
BUN SERPL-MCNC: 50 MG/DL
CALCIUM SERPL-MCNC: 7.9 MG/DL
CHLORIDE SERPL-SCNC: 118 MMOL/L
CO2 SERPL-SCNC: 16 MMOL/L
CREAT SERPL-MCNC: 3 MG/DL
DIFFERENTIAL METHOD: ABNORMAL
EOSINOPHIL # BLD AUTO: 0 K/UL
EOSINOPHIL NFR BLD: 0.2 %
ERYTHROCYTE [DISTWIDTH] IN BLOOD BY AUTOMATED COUNT: 16.2 %
EST. GFR  (AFRICAN AMERICAN): 23.9 ML/MIN/1.73 M^2
EST. GFR  (NON AFRICAN AMERICAN): 20.7 ML/MIN/1.73 M^2
GLUCOSE SERPL-MCNC: 87 MG/DL
HCO3 UR-SCNC: 18.9 MMOL/L (ref 24–28)
HCT VFR BLD AUTO: 38.5 %
HGB BLD-MCNC: 11.9 G/DL
HYPOCHROMIA BLD QL SMEAR: ABNORMAL
IMM GRANULOCYTES # BLD AUTO: 0.65 K/UL
IMM GRANULOCYTES NFR BLD AUTO: 2.7 %
LYMPHOCYTES # BLD AUTO: 1.6 K/UL
LYMPHOCYTES NFR BLD: 6.6 %
MAGNESIUM SERPL-MCNC: 0.9 MG/DL
MAGNESIUM SERPL-MCNC: 1.2 MG/DL
MCH RBC QN AUTO: 27.9 PG
MCHC RBC AUTO-ENTMCNC: 30.9 G/DL
MCV RBC AUTO: 90 FL
MONOCYTES # BLD AUTO: 2.9 K/UL
MONOCYTES NFR BLD: 12.3 %
NEUTROPHILS # BLD AUTO: 18.6 K/UL
NEUTROPHILS NFR BLD: 77.7 %
NRBC BLD-RTO: 0 /100 WBC
OVALOCYTES BLD QL SMEAR: ABNORMAL
PCO2 BLDA: 33.6 MMHG (ref 35–45)
PH SMN: 7.36 [PH] (ref 7.35–7.45)
PHOSPHATE SERPL-MCNC: 4.8 MG/DL
PLATELET # BLD AUTO: 142 K/UL
PMV BLD AUTO: 11.7 FL
PO2 BLDA: 154 MMHG (ref 80–100)
POC BE: -7 MMOL/L
POC SATURATED O2: 99 % (ref 95–100)
POC TCO2: 20 MMOL/L (ref 23–27)
POCT GLUCOSE: 102 MG/DL (ref 70–110)
POCT GLUCOSE: 103 MG/DL (ref 70–110)
POCT GLUCOSE: 105 MG/DL (ref 70–110)
POCT GLUCOSE: 110 MG/DL (ref 70–110)
POCT GLUCOSE: 118 MG/DL (ref 70–110)
POCT GLUCOSE: 66 MG/DL (ref 70–110)
POCT GLUCOSE: 78 MG/DL (ref 70–110)
POCT GLUCOSE: 80 MG/DL (ref 70–110)
POCT GLUCOSE: 84 MG/DL (ref 70–110)
POCT GLUCOSE: 84 MG/DL (ref 70–110)
POCT GLUCOSE: 92 MG/DL (ref 70–110)
POCT GLUCOSE: 92 MG/DL (ref 70–110)
POCT GLUCOSE: 93 MG/DL (ref 70–110)
POCT GLUCOSE: 94 MG/DL (ref 70–110)
POCT GLUCOSE: 99 MG/DL (ref 70–110)
POIKILOCYTOSIS BLD QL SMEAR: SLIGHT
POLYCHROMASIA BLD QL SMEAR: ABNORMAL
POTASSIUM SERPL-SCNC: 4.6 MMOL/L
PROT SERPL-MCNC: 5.4 G/DL
RBC # BLD AUTO: 4.27 M/UL
SAMPLE: ABNORMAL
SITE: ABNORMAL
SODIUM SERPL-SCNC: 145 MMOL/L
WBC # BLD AUTO: 23.89 K/UL

## 2019-02-12 PROCEDURE — 80053 COMPREHEN METABOLIC PANEL: CPT

## 2019-02-12 PROCEDURE — 25000003 PHARM REV CODE 250: Performed by: PSYCHIATRY & NEUROLOGY

## 2019-02-12 PROCEDURE — 63600175 PHARM REV CODE 636 W HCPCS: Performed by: NURSE PRACTITIONER

## 2019-02-12 PROCEDURE — A4216 STERILE WATER/SALINE, 10 ML: HCPCS | Performed by: NURSE PRACTITIONER

## 2019-02-12 PROCEDURE — 99233 PR SUBSEQUENT HOSPITAL CARE,LEVL III: ICD-10-PCS | Mod: GC,,, | Performed by: PSYCHIATRY & NEUROLOGY

## 2019-02-12 PROCEDURE — 99233 SBSQ HOSP IP/OBS HIGH 50: CPT | Mod: ,,, | Performed by: PHYSICIAN ASSISTANT

## 2019-02-12 PROCEDURE — 99900017 HC EXTUBATION W/PARAMETERS (STAT)

## 2019-02-12 PROCEDURE — 99900035 HC TECH TIME PER 15 MIN (STAT)

## 2019-02-12 PROCEDURE — 63600175 PHARM REV CODE 636 W HCPCS: Performed by: PHYSICIAN ASSISTANT

## 2019-02-12 PROCEDURE — 25000003 PHARM REV CODE 250: Performed by: PHYSICIAN ASSISTANT

## 2019-02-12 PROCEDURE — 94010 BREATHING CAPACITY TEST: CPT

## 2019-02-12 PROCEDURE — 82803 BLOOD GASES ANY COMBINATION: CPT

## 2019-02-12 PROCEDURE — 99900026 HC AIRWAY MAINTENANCE (STAT)

## 2019-02-12 PROCEDURE — 27000221 HC OXYGEN, UP TO 24 HOURS

## 2019-02-12 PROCEDURE — 25000003 PHARM REV CODE 250: Performed by: NURSE PRACTITIONER

## 2019-02-12 PROCEDURE — 63600175 PHARM REV CODE 636 W HCPCS: Performed by: PSYCHIATRY & NEUROLOGY

## 2019-02-12 PROCEDURE — 20000000 HC ICU ROOM

## 2019-02-12 PROCEDURE — 36600 WITHDRAWAL OF ARTERIAL BLOOD: CPT

## 2019-02-12 PROCEDURE — 99233 SBSQ HOSP IP/OBS HIGH 50: CPT | Mod: GC,,, | Performed by: PSYCHIATRY & NEUROLOGY

## 2019-02-12 PROCEDURE — 94150 VITAL CAPACITY TEST: CPT

## 2019-02-12 PROCEDURE — 27200966 HC CLOSED SUCTION SYSTEM

## 2019-02-12 PROCEDURE — 83735 ASSAY OF MAGNESIUM: CPT | Mod: 91

## 2019-02-12 PROCEDURE — 84100 ASSAY OF PHOSPHORUS: CPT

## 2019-02-12 PROCEDURE — 94003 VENT MGMT INPAT SUBQ DAY: CPT

## 2019-02-12 PROCEDURE — 27100171 HC OXYGEN HIGH FLOW UP TO 24 HOURS

## 2019-02-12 PROCEDURE — 94761 N-INVAS EAR/PLS OXIMETRY MLT: CPT

## 2019-02-12 PROCEDURE — 83735 ASSAY OF MAGNESIUM: CPT

## 2019-02-12 PROCEDURE — 27100092 HC HIGH FLOW DELIVERY CANNULA

## 2019-02-12 PROCEDURE — 85025 COMPLETE CBC W/AUTO DIFF WBC: CPT

## 2019-02-12 PROCEDURE — 99233 PR SUBSEQUENT HOSPITAL CARE,LEVL III: ICD-10-PCS | Mod: ,,, | Performed by: PHYSICIAN ASSISTANT

## 2019-02-12 RX ORDER — IBUPROFEN 200 MG
16 TABLET ORAL
Status: DISCONTINUED | OUTPATIENT
Start: 2019-02-12 | End: 2019-02-15 | Stop reason: HOSPADM

## 2019-02-12 RX ORDER — HYDRALAZINE HYDROCHLORIDE 20 MG/ML
10 INJECTION INTRAMUSCULAR; INTRAVENOUS EVERY 8 HOURS PRN
Status: DISCONTINUED | OUTPATIENT
Start: 2019-02-12 | End: 2019-02-14

## 2019-02-12 RX ORDER — ACETAMINOPHEN 325 MG/1
650 TABLET ORAL EVERY 6 HOURS PRN
Status: DISCONTINUED | OUTPATIENT
Start: 2019-02-12 | End: 2019-02-15 | Stop reason: HOSPADM

## 2019-02-12 RX ORDER — DEXTROSE MONOHYDRATE 25 G/50ML
12.5 INJECTION, SOLUTION INTRAVENOUS
Status: DISCONTINUED | OUTPATIENT
Start: 2019-02-12 | End: 2019-02-15 | Stop reason: HOSPADM

## 2019-02-12 RX ORDER — MAGNESIUM SULFATE HEPTAHYDRATE 40 MG/ML
2 INJECTION, SOLUTION INTRAVENOUS ONCE
Status: COMPLETED | OUTPATIENT
Start: 2019-02-12 | End: 2019-02-12

## 2019-02-12 RX ORDER — DEXTROSE MONOHYDRATE 25 G/50ML
25 INJECTION, SOLUTION INTRAVENOUS
Status: DISCONTINUED | OUTPATIENT
Start: 2019-02-12 | End: 2019-02-15 | Stop reason: HOSPADM

## 2019-02-12 RX ORDER — ALLOPURINOL 100 MG/1
200 TABLET ORAL DAILY
Status: DISCONTINUED | OUTPATIENT
Start: 2019-02-13 | End: 2019-02-15 | Stop reason: HOSPADM

## 2019-02-12 RX ORDER — GLUCAGON 1 MG
1 KIT INJECTION
Status: DISCONTINUED | OUTPATIENT
Start: 2019-02-12 | End: 2019-02-15 | Stop reason: HOSPADM

## 2019-02-12 RX ORDER — LABETALOL HCL 20 MG/4 ML
10 SYRINGE (ML) INTRAVENOUS EVERY 4 HOURS PRN
Status: DISCONTINUED | OUTPATIENT
Start: 2019-02-12 | End: 2019-02-15 | Stop reason: HOSPADM

## 2019-02-12 RX ORDER — INSULIN ASPART 100 [IU]/ML
1-10 INJECTION, SOLUTION INTRAVENOUS; SUBCUTANEOUS
Status: DISCONTINUED | OUTPATIENT
Start: 2019-02-12 | End: 2019-02-15 | Stop reason: HOSPADM

## 2019-02-12 RX ORDER — CARVEDILOL 12.5 MG/1
12.5 TABLET ORAL 2 TIMES DAILY
Status: DISCONTINUED | OUTPATIENT
Start: 2019-02-12 | End: 2019-02-13

## 2019-02-12 RX ORDER — IBUPROFEN 200 MG
24 TABLET ORAL
Status: DISCONTINUED | OUTPATIENT
Start: 2019-02-12 | End: 2019-02-15 | Stop reason: HOSPADM

## 2019-02-12 RX ADMIN — DEXMEDETOMIDINE HYDROCHLORIDE 0.4 MCG/KG/HR: 100 INJECTION, SOLUTION, CONCENTRATE INTRAVENOUS at 12:02

## 2019-02-12 RX ADMIN — LABETALOL HYDROCHLORIDE 10 MG: 5 INJECTION, SOLUTION INTRAVENOUS at 03:02

## 2019-02-12 RX ADMIN — ASPIRIN 325 MG ORAL TABLET 325 MG: 325 PILL ORAL at 08:02

## 2019-02-12 RX ADMIN — HYDRALAZINE HYDROCHLORIDE 10 MG: 20 INJECTION INTRAMUSCULAR; INTRAVENOUS at 06:02

## 2019-02-12 RX ADMIN — MAGNESIUM SULFATE HEPTAHYDRATE 1 G: 500 INJECTION, SOLUTION INTRAMUSCULAR; INTRAVENOUS at 06:02

## 2019-02-12 RX ADMIN — PIPERACILLIN AND TAZOBACTAM 4.5 G: 4; .5 INJECTION, POWDER, LYOPHILIZED, FOR SOLUTION INTRAVENOUS; PARENTERAL at 03:02

## 2019-02-12 RX ADMIN — STANDARDIZED SENNA CONCENTRATE AND DOCUSATE SODIUM 1 TABLET: 8.6; 5 TABLET, FILM COATED ORAL at 08:02

## 2019-02-12 RX ADMIN — ACETAMINOPHEN 650 MG: 325 TABLET ORAL at 08:02

## 2019-02-12 RX ADMIN — ATORVASTATIN CALCIUM 40 MG: 20 TABLET, FILM COATED ORAL at 08:02

## 2019-02-12 RX ADMIN — CARVEDILOL 12.5 MG: 12.5 TABLET, FILM COATED ORAL at 08:02

## 2019-02-12 RX ADMIN — Medication 1250 MG: at 07:02

## 2019-02-12 RX ADMIN — FAMOTIDINE 20 MG: 20 TABLET ORAL at 08:02

## 2019-02-12 RX ADMIN — PIPERACILLIN AND TAZOBACTAM 4.5 G: 4; .5 INJECTION, POWDER, LYOPHILIZED, FOR SOLUTION INTRAVENOUS; PARENTERAL at 06:02

## 2019-02-12 RX ADMIN — CHLORHEXIDINE GLUCONATE 0.12% ORAL RINSE 15 ML: 1.2 LIQUID ORAL at 08:02

## 2019-02-12 RX ADMIN — PIPERACILLIN AND TAZOBACTAM 4.5 G: 4; .5 INJECTION, POWDER, LYOPHILIZED, FOR SOLUTION INTRAVENOUS; PARENTERAL at 11:02

## 2019-02-12 RX ADMIN — Medication 3 ML: at 02:02

## 2019-02-12 RX ADMIN — MAGNESIUM SULFATE IN WATER 2 G: 40 INJECTION, SOLUTION INTRAVENOUS at 07:02

## 2019-02-12 NOTE — SUBJECTIVE & OBJECTIVE
Past Medical History:   Diagnosis Date    Diabetes mellitus type 2 in obese     Encephalopathy acute 2/11/2019    HTN (hypertension), benign     Renal disease      History reviewed. No pertinent surgical history.  History reviewed. No pertinent family history.  Social History     Tobacco Use    Smoking status: Never Smoker    Smokeless tobacco: Never Used   Substance Use Topics    Alcohol use: Not on file    Drug use: Not on file     Review of patient's allergies indicates:   Allergen Reactions    Codeine Other (See Comments)       Medications: I have reviewed the current medication administration record.    Medications Prior to Admission   Medication Sig Dispense Refill Last Dose    allopurinol (ZYLOPRIM) 300 MG tablet Take 300 mg by mouth once daily.   Unknown at Unknown time    aspirin (ECOTRIN) 81 MG EC tablet Take 81 mg by mouth once daily.   Unknown at Unknown time    calcitRIOL (ROCALTROL) 0.25 MCG Cap Take 0.25 mcg by mouth once daily.   Unknown at Unknown time    carvedilol (COREG) 12.5 MG tablet Take 12.5 mg by mouth 2 (two) times daily.   Unknown at Unknown time    cloNIDine (CATAPRES) 0.2 MG tablet Take 0.2 mg by mouth 2 (two) times daily.   Unknown at Unknown time    cyclobenzaprine (FLEXERIL) 10 MG tablet Take 10 mg by mouth 3 (three) times daily as needed for Muscle spasms.   Unknown at Unknown time    fluticasone (FLONASE) 50 mcg/actuation nasal spray 1 spray by Each Nare route once daily.   Unknown at Unknown time    pioglitazone-glimepiride (DUETACT) 30-4 mg per tablet Take 1 tablet by mouth 2 (two) times daily.   Unknown at Unknown time    simvastatin (ZOCOR) 20 MG tablet Take 20 mg by mouth once daily.   Unknown at Unknown time    sodium bicarbonate 650 MG tablet Take 650 mg by mouth 3 (three) times daily.   Unknown at Unknown time       Review of Systems   Unable to perform ROS: Intubated   Constitutional: Negative for chills and fever.   Respiratory: Negative for cough  and shortness of breath.    Cardiovascular: Negative for chest pain.   Gastrointestinal: Negative for abdominal pain, diarrhea, nausea and vomiting.   Neurological: Negative for headaches.     Objective:     Vital Signs (Most Recent):  Temp: 99 °F (37.2 °C) (02/12/19 0700)  Pulse: 69 (02/12/19 0800)  Resp: 16 (02/12/19 0800)  BP: 136/61 (02/12/19 0800)  SpO2: 99 % (02/12/19 0800)    Vital Signs Range (Last 24H):  Temp:  [97.9 °F (36.6 °C)-99.1 °F (37.3 °C)]   Pulse:  []   Resp:  [14-21]   BP: ()/(50-76)   SpO2:  [99 %-100 %]      Vent Mode: A/C  Oxygen Concentration (%):  [40] 40  Resp Rate Total:  [14 br/min-18 br/min] 14 br/min  Vt Set:  [500 mL] 500 mL  PEEP/CPAP:  [5 cmH20] 5 cmH20  Mean Airway Pressure:  [9.1 cmH20-10 cmH20] 10 cmH20    Physical Exam   Constitutional: He appears well-developed and well-nourished.   HENT:   Head: Normocephalic and atraumatic.   Pulmonary/Chest:   Intubated on vent   Neurological:   Obtunded due to sedation  Moves right side side  Left side to painful stimuli   Nursing note and vitals reviewed.      Neurological Exam:   LOC: obtunded  Attention Span: poor  Language: Intubated  Articulation: Untestable due to intubation  Orientation: Untestable due to intubation  Visual Fields: Full  EOM (CN III, IV, VI): Gaze preference  right  Pupils (CN II, III): PERRL  Facial Sensation (CN V): Normal  Facial Movement (CN VII): Symmetric facial expression    Gag Reflex: present  Reflexes: flexor plantar responses bilaterally  Motor: moves right left to painful  Cebellar: No evidence of appendicular or axial ataxia  Sensation: Intact to light touch, temperature and vibration  Tone: Flaccid  LUE , LLE, RUE and RLE       Laboratory:  CMP:   Recent Labs   Lab 02/12/19  0343   CALCIUM 7.9*   ALBUMIN 2.3*   PROT 5.4*      K 4.6   CO2 16*   *   BUN 50*   CREATININE 3.0*   ALKPHOS 99   ALT 19   AST 23   BILITOT 1.4*     CBC:   Recent Labs   Lab 02/12/19  0343   WBC 23.89*   RBC  4.27*   HGB 11.9*   HCT 38.5*   *   MCV 90   MCH 27.9   MCHC 30.9*     Lipid Panel:   Recent Labs   Lab 02/11/19  0655   CHOL 105*   LDLCALC 49.0*   HDL 29*   TRIG 135     Coagulation: No results for input(s): PT, INR, APTT in the last 168 hours.  Hgb A1C:   Recent Labs   Lab 02/11/19  0655   HGBA1C 8.2*     TSH:   Recent Labs   Lab 02/11/19 0655   TSH 1.453       Diagnostic Results:      Brain imaging:      Vessel Imaging:      Cardiac Evaluation:

## 2019-02-12 NOTE — HOSPITAL COURSE
02/12/2019: YULIA, patient's BP in more acceptable range. Patient remains intubated and sedated on Precedex  02/13/2019: Patient extubated by primary service, physical exam returned back to baseline. Patient continues to be covered on ABx for suspected aspiration PNA  2/14 - Patient doing well on exam today. Sitting in chair. Primary team continuing to adjust BP regimen. Patient to step down to hospital medicine today.

## 2019-02-12 NOTE — ASSESSMENT & PLAN NOTE
- Report of aspiration prior to intubation   - Respiratory culture growing Ecoli, sensitivities pending   - Discontinue vancomycin, continue zosyn until cultures finalized  - Tolerating extubation well, on RA   - Leukocytosis persists

## 2019-02-12 NOTE — PROGRESS NOTES
Updated Dr. Hsu that UOP continues to be 15-40ml.hr and Cr 3.0 up from 2.1 with Mg 1.2, will increase NS IVF from 75 to 100ml/hr and replace magnesium.

## 2019-02-12 NOTE — ASSESSMENT & PLAN NOTE
Stroke risk factor    -Last A1c reviewed-   Lab Results   Component Value Date    HGBA1C 8.2 (H) 02/11/2019       Home Antihyperglycemic Regiment:  -unknown at this time    Inpatient Antihyperglycemic Regiment:   Antihyperglycemics (From admission, onward)    Start     Stop Route Frequency Ordered    02/11/19 0730  insulin regular (Humulin R) 100 Units in sodium chloride 0.9% 100 mL infusion     Question:  Insulin Rate Adjustment (DO NOT MODIFY ANSWER)  Answer:  [email or web address] [id number]    -- IV Continuous 02/11/19 0626            Blood Sugars (AccuCheck):  Recent Labs     02/12/19  0508 02/12/19  0638 02/12/19  0718 02/12/19  0800 02/12/19  0903 02/12/19  1000   POCTGLUCOSE 80 78 84 102 118* 110

## 2019-02-12 NOTE — PROGRESS NOTES
Pt extubated by RT at bedside per order. Dr Watson at bedside for extubation. Pt placed on NC and O2 Sats 100%. Pt is alert, oriented and following all commands. Calm and cooperative. Monitoring closely.

## 2019-02-12 NOTE — ASSESSMENT & PLAN NOTE
67 y/o male with AMS and hypertensive urgency with LSW, AMS and right gaze upon initial presentation     Antithrombotics:  mg daily    Statin: Lipitor 40 mg daily    Therapy: PT/OT/ST    Diagnostics: MRI brain, 2D Echo, lipid panel, HgB A1C, TSH    Risk factor Modifications: HTN, DM, renal disease      MRI showing findings consistent with PRES  Concomitant prerenal CLAUDIO and HTN at admission with SBP >200     Rec:  -Continue tight BP control with SBP <160  - mg QD  -Lipitor 40 mg QD   -Neuro checks q4  -will continue to follow patient

## 2019-02-12 NOTE — PROGRESS NOTES
[facility]  Vascular Neurology  Comprehensive Stroke Center  Progress Note    Assessment/Plan:     * Encephalopathy acute    67 y/o male with AMS and hypertensive urgency with LSW and right gaze with possible R MCA stroke    Antithrombotics:  mg daily    Statin: Lipitor 40 mg daily    Therapy: PT/OT/ST    Diagnostics: MRI brain, 2D Echo, lipid panel, HgB A1C, TSH    Risk factor Modifications: HTN, DM, renal disease    SBP: possible infarct no intervention <160    MRI showing findings consistent with PRES   -Encephalopathy potentially reversible in the setting of extreme HTN  -Recommend appropriate BP control with SBP goal <160  -Avoid overcorrection of BP to avoid iatrogenic ischemia   -Daily SBT and awakening trials to assess neuro function  -Neurochecks q4       Essential hypertension    Stroke risk factor  SBP <160     Type 2 diabetes mellitus with hyperglycemia, without long-term current use of insulin    Stroke risk factor  HgB A1C  SSI          02/12/2019: WENDYEON, patient's BP in more acceptable range. Physical exam returned back to baseline. Likely step down to floor.     STROKE DOCUMENTATION       NIH Scale:  1a. Level of Consciousness: 2-->Not alert, requires repeated stimulation to attend, or is obtunded and requires strong or painful stimulation to make movements (not stereotyped)  1b. LOC Questions: 2-->Answers neither question correctly  1c. LOC Commands: 1-->Performs one task correctly  2. Best Gaze: 2-->Forced deviation, or total gaze paresis not overcome by the oculocephalic maneuver  3. Visual: 0-->No visual loss  4. Facial Palsy: 0-->Normal symmetrical movements  5a. Motor Arm, Left: 4-->No movement  5b. Motor Arm, Right: 2-->Some effort against gravity, limb cannot get to or maintain (if cued) 90 (or 45) degrees, drifts down to bed, but has some effort against gravity  6a. Motor Leg, Left: 4-->No movement  6b. Motor Leg, Right: 2-->Some effort against gravity, leg falls  to bed by 5 secs, but has some effort against gravity  7. Limb Ataxia: 0-->Absent  8. Sensory: 0-->Normal, no sensory loss  9. Best Language: 0-->No aphasia, normal  10. Dysarthria: (UN) Intubated or other physical barrier  11. Extinction and Inattention (formerly Neglect): 0-->No abnormality  Total (NIH Stroke Scale): 19      Modified [place] Score: 0  [place] Coma Scale:9   ABCD2 Score:    [id number] Score:   HAS -BLED Score:   ICH Score:   Hunt & Crowder Classification:           Hemorrhagic change of an Ischemic Stroke: Does this patient have an ischemic stroke with hemorrhagic changes? No     Mri Brain Without Contrast    Result Date: 2/11/2019  1. Patchy cortical and subcortical T2/FLAIR hyperintensity with a posterior predominant distribution.  Findings highly suggestive of posterior reversible encephalopathy syndrome (PRES).  Other encephalitides thought unlikely, but clinical correlation is required.  Repeat MRI for confirmatory purposes if/when clinically appropriate. 2. No associated hemorrhage or infarct at this time. 3. Mild chronic microvascular ischemic disease This report was flagged in Epic as abnormal. Electronically signed by resident: Addison Ng Date:    02/11/2019 Time:    09:50 Electronically signed by: Haroldo Ashford MD Date:    02/11/2019 Time:    10:40      Otis Soler MD  [facility] Stroke Center  Department of Vascular Neurology   [facility]

## 2019-02-12 NOTE — PROGRESS NOTES
Called and spoke with [facility] MD to notify of pt's continued decrease in urine output 15-20 cc/hr and increase in creatinine. No new orders at this time.   Asked MD if she would like a SBT done as well. MD would like one. RT called and will place pt on SBT shortly.

## 2019-02-12 NOTE — ASSESSMENT & PLAN NOTE
Likely etiology of acute encephalopathy, may be from chemotherapy vs HTN  - MRI completed  - Extubated today  - vascular Neurology following  - SBP goal 100-180  - Continue hourly neuro checks   - Vascular Neurology following   - PT/OT/SLP ordered post-extubation

## 2019-02-12 NOTE — PLAN OF CARE
Problem: Adult Inpatient Plan of Care  Goal: Plan of Care Review  Outcome: Ongoing (interventions implemented as appropriate)     See vital signs and assessments in flowsheets. See below for updates on today's progress.     Pulmonary: Pt extubated per order this morning. No issues since extubation and is now on room air with O2 Sats %. Denies SOB, lungs clear. Pt is coughing up thick red streaked sputum likely from agitation from ET tube.     Cardiovascular: Pt Hypertensive through the shift. MD called and PRN medications ordered late afternoon. Labetalol 10 mg IV given per order. Systolic decreased from 190-200 to 160's. HR was also elevated in 120's and is now 104 since medication given.     Neurological: Very pleasant, calm, and cooperative. Follows all commands. Afebrile. All pulses palpable.     Gastrointestinal: Abdomen rounded/obese with hypoactive BS. No BM this shift. Pt remains on bowel regimen.     Genitourinary: wilson remains in place. UO low this morning 15-20 ml/hr but has since increased to 60-80 ml/hr.     Endocrine: Hourly Glucose checks remains. BG 90-low 100's all shift.     Integumentary/Other: Skin tears noted on arms/foam dressing in place. No odor/oozing.     Infusions: NS @ 100ml/hr    Patient progressing towards goals as tolerated, plan of care communicated and reviewed with Michele Leach and family. All concerns addressed. Will continue to monitor.

## 2019-02-12 NOTE — PROGRESS NOTES
Pt having multiple episodes of Apnea once on SBT. Precedex was turned off before, however pt may need more time to wake up before trying again. Lights on, blinds open, TV on to stimulate pt. RT at bedside and pt placed back on rate on vent. Will attempt this afternoon when more awake.

## 2019-02-12 NOTE — PLAN OF CARE
No acute events throughout day. See vital signs and assessments in flowsheets. See below for updates on today's progress...    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 All questions and concerns addressed. Plan is for SBT tonight or tomorrow morning for possible extubation. Will continue to monitor.

## 2019-02-12 NOTE — PROGRESS NOTES
Pt's systolic . Called [facility] to notify. MD will place orders. Also notified that RT performed parameters for SBT and patient did very well. After speaking with Dr hSirley KAPOOR telephone order to extubate patient. Will notify RT.     6073: No new orders placed yet for PRN BP medications.

## 2019-02-12 NOTE — ASSESSMENT & PLAN NOTE
Insulin gtt started on admission for BG >400  - Hemoglobin A1c 8.2  - Off insulin gtt overnight  - Begin diet as patient has passed bedside swallowing assessment  - SSI

## 2019-02-12 NOTE — PT/OT/SLP PROGRESS
Physical Therapy      Patient Name:  Michele Leach   MRN:  [id number]    Patient not seen today. Pt extubated at 1315. Per protocol, therapy to wait 2 hours post extubation to work with pt. PT unable to return. Will follow-up when appropriate.    Cheyenne Sellers, PT, DPT  2/12/2019  [phone]

## 2019-02-12 NOTE — PROGRESS NOTES
[facility]  Vascular Neurology  Comprehensive Stroke Center  Progress Note    Assessment/Plan:     * Posterior reversible encephalopathy syndrome    67 y/o male with AMS and hypertensive urgency with LSW, AMS and right gaze upon initial presentation     Antithrombotics:  mg daily    Statin: Lipitor 40 mg daily    Therapy: PT/OT/ST    Diagnostics: MRI brain, 2D Echo, lipid panel, HgB A1C, TSH    Risk factor Modifications: HTN, DM, renal disease      MRI showing findings consistent with PRES  Concomitant prerenal CLAUDIO and HTN at admission with SBP >200     Rec:  -Continue tight BP control with SBP <160  - mg QD  -Lipitor 40 mg QD   -Neuro checks q4  -will continue to follow patient          Essential hypertension    Stroke risk factor  SBP <160    -Avoid overcorrecting hypertension     Type 2 diabetes mellitus with hyperglycemia, without long-term current use of insulin    Stroke risk factor    -Last A1c reviewed-   Lab Results   Component Value Date    HGBA1C 8.2 (H) 02/11/2019       Home Antihyperglycemic Regiment:  -unknown at this time    Inpatient Antihyperglycemic Regiment:   Antihyperglycemics (From admission, onward)    Start     Stop Route Frequency Ordered    02/11/19 0730  insulin regular (Humulin R) 100 Units in sodium chloride 0.9% 100 mL infusion     Question:  Insulin Rate Adjustment (DO NOT MODIFY ANSWER)  Answer:  [email or web address]    -- IV Continuous 02/11/19 0626            Blood Sugars (AccuCheck):  Recent Labs     02/12/19  0508 02/12/19  0638 02/12/19  0718 02/12/19  0800 02/12/19  0903 02/12/19  1000   POCTGLUCOSE 80 78 84 102 118* 110                Encephalopathy acute    See PRES            02/12/2019: WENDYEON, patient's BP in more acceptable range. Patient remains intubated and sedated on Precedex    STROKE DOCUMENTATION        NIH Scale:  1a. Level of Consciousness: 2-->Not alert, requires repeated  stimulation to attend, or is obtunded and requires strong or painful stimulation to make movements (not stereotyped)  1b. LOC Questions: 2-->Answers neither question correctly  1c. LOC Commands: 2-->Performs neither task correctly  2. Best Gaze: 2-->Forced deviation, or total gaze paresis not overcome by the oculocephalic maneuver  3. Visual: 0-->No visual loss  4. Facial Palsy: 0-->Normal symmetrical movements  5a. Motor Arm, Left: 4-->No movement  5b. Motor Arm, Right: 3-->No effort against gravity, limb falls  6a. Motor Leg, Left: 3-->No effort against gravity, leg falls to bed immediately  6b. Motor Leg, Right: 3-->No effort against gravity, leg falls to bed immediately  7. Limb Ataxia: 0-->Absent  8. Sensory: 0-->Normal, no sensory loss  9. Best Language: 0-->No aphasia, normal  10. Dysarthria: (UN) Intubated or other physical barrier  11. Extinction and Inattention (formerly Neglect): 0-->No abnormality  Total (NIH Stroke Scale): 21       Modified [place] Score: 0  [place] Coma Scale:    ABCD2 Score:    [id number] Score:   HAS -BLED Score:   ICH Score:   Hunt & Crowder Classification:      Hemorrhagic change of an Ischemic Stroke: Does this patient have an ischemic stroke with hemorrhagic changes? No         Past Medical History:   Diagnosis Date    Diabetes mellitus type 2 in obese     Encephalopathy acute 2/11/2019    HTN (hypertension), benign     Renal disease      History reviewed. No pertinent surgical history.  History reviewed. No pertinent family history.  Social History     Tobacco Use    Smoking status: Never Smoker    Smokeless tobacco: Never Used   Substance Use Topics    Alcohol use: Not on file    Drug use: Not on file     Review of patient's allergies indicates:   Allergen Reactions    Codeine Other (See Comments)       Medications: I have reviewed the current medication administration record.    Medications Prior to Admission   Medication Sig Dispense Refill Last Dose    allopurinol  (ZYLOPRIM) 300 MG tablet Take 300 mg by mouth once daily.   Unknown at Unknown time    aspirin (ECOTRIN) 81 MG EC tablet Take 81 mg by mouth once daily.   Unknown at Unknown time    calcitRIOL (ROCALTROL) 0.25 MCG Cap Take 0.25 mcg by mouth once daily.   Unknown at Unknown time    carvedilol (COREG) 12.5 MG tablet Take 12.5 mg by mouth 2 (two) times daily.   Unknown at Unknown time    cloNIDine (CATAPRES) 0.2 MG tablet Take 0.2 mg by mouth 2 (two) times daily.   Unknown at Unknown time    cyclobenzaprine (FLEXERIL) 10 MG tablet Take 10 mg by mouth 3 (three) times daily as needed for Muscle spasms.   Unknown at Unknown time    fluticasone (FLONASE) 50 mcg/actuation nasal spray 1 spray by Each Nare route once daily.   Unknown at Unknown time    pioglitazone-glimepiride (DUETACT) 30-4 mg per tablet Take 1 tablet by mouth 2 (two) times daily.   Unknown at Unknown time    simvastatin (ZOCOR) 20 MG tablet Take 20 mg by mouth once daily.   Unknown at Unknown time    sodium bicarbonate 650 MG tablet Take 650 mg by mouth 3 (three) times daily.   Unknown at Unknown time       Review of Systems   Unable to perform ROS: Intubated   Constitutional: Negative for chills and fever.   Respiratory: Negative for cough and shortness of breath.    Cardiovascular: Negative for chest pain.   Gastrointestinal: Negative for abdominal pain, diarrhea, nausea and vomiting.   Neurological: Negative for headaches.     Objective:     Vital Signs (Most Recent):  Temp: 99 °F (37.2 °C) (02/12/19 0700)  Pulse: 69 (02/12/19 0800)  Resp: 16 (02/12/19 0800)  BP: 136/61 (02/12/19 0800)  SpO2: 99 % (02/12/19 0800)    Vital Signs Range (Last 24H):  Temp:  [97.9 °F (36.6 °C)-99.1 °F (37.3 °C)]   Pulse:  []   Resp:  [14-21]   BP: ()/(50-76)   SpO2:  [99 %-100 %]      Vent Mode: A/C  Oxygen Concentration (%):  [40] 40  Resp Rate Total:  [14 br/min-18 br/min] 14 br/min  Vt Set:  [500 mL] 500 mL  PEEP/CPAP:  [5 cmH20] 5 cmH20  Mean Airway  Pressure:  [9.1 cmH20-10 cmH20] 10 cmH20    Physical Exam   Constitutional: He appears well-developed and well-nourished.   HENT:   Head: Normocephalic and atraumatic.   Pulmonary/Chest:   Intubated on vent   Neurological:   Obtunded due to sedation  Moves right side side  Left side to painful stimuli   Nursing note and vitals reviewed.      Neurological Exam:   LOC: obtunded  Attention Span: poor  Language: Intubated  Articulation: Untestable due to intubation  Orientation: Untestable due to intubation  Visual Fields: Full  EOM (CN III, IV, VI): Gaze preference  right  Pupils (CN II, III): PERRL  Facial Sensation (CN V): Normal  Facial Movement (CN VII): Symmetric facial expression    Gag Reflex: present  Reflexes: flexor plantar responses bilaterally  Motor: moves right left to painful  Cebellar: No evidence of appendicular or axial ataxia  Sensation: Intact to light touch, temperature and vibration  Tone: Flaccid  LUE , LLE, RUE and RLE       Laboratory:  CMP:   Recent Labs   Lab 02/12/19  0343   CALCIUM 7.9*   ALBUMIN 2.3*   PROT 5.4*      K 4.6   CO2 16*   *   BUN 50*   CREATININE 3.0*   ALKPHOS 99   ALT 19   AST 23   BILITOT 1.4*     CBC:   Recent Labs   Lab 02/12/19  0343   WBC 23.89*   RBC 4.27*   HGB 11.9*   HCT 38.5*   *   MCV 90   MCH 27.9   MCHC 30.9*     Lipid Panel:   Recent Labs   Lab 02/11/19  0655   CHOL 105*   LDLCALC 49.0*   HDL 29*   TRIG 135     Coagulation: No results for input(s): PT, INR, APTT in the last 168 hours.  Hgb A1C:   Recent Labs   Lab 02/11/19  0655   HGBA1C 8.2*     TSH:   Recent Labs   Lab 02/11/19  0655   TSH 1.453         Otis Soler MD  Comprehensive Stroke Center  Department of Vascular Neurology   [facility]

## 2019-02-13 LAB
ALBUMIN SERPL BCP-MCNC: 1.9 G/DL
ALP SERPL-CCNC: 84 U/L
ALT SERPL W/O P-5'-P-CCNC: 16 U/L
ANION GAP SERPL CALC-SCNC: 9 MMOL/L
AST SERPL-CCNC: 24 U/L
BACTERIA SPEC AEROBE CULT: NORMAL
BASOPHILS # BLD AUTO: 0.13 K/UL
BASOPHILS NFR BLD: 0.7 %
BILIRUB SERPL-MCNC: 0.8 MG/DL
BUN SERPL-MCNC: 56 MG/DL
CALCIUM SERPL-MCNC: 7.1 MG/DL
CHLORIDE SERPL-SCNC: 119 MMOL/L
CO2 SERPL-SCNC: 15 MMOL/L
CREAT SERPL-MCNC: 2.6 MG/DL
DIFFERENTIAL METHOD: ABNORMAL
EOSINOPHIL # BLD AUTO: 0.5 K/UL
EOSINOPHIL NFR BLD: 2.6 %
ERYTHROCYTE [DISTWIDTH] IN BLOOD BY AUTOMATED COUNT: 16.1 %
EST. GFR  (AFRICAN AMERICAN): 28.4 ML/MIN/1.73 M^2
EST. GFR  (NON AFRICAN AMERICAN): 24.6 ML/MIN/1.73 M^2
GLUCOSE SERPL-MCNC: 79 MG/DL
GRAM STN SPEC: NORMAL
HCT VFR BLD AUTO: 34.1 %
HGB BLD-MCNC: 10.4 G/DL
IMM GRANULOCYTES # BLD AUTO: 0.44 K/UL
IMM GRANULOCYTES NFR BLD AUTO: 2.4 %
LYMPHOCYTES # BLD AUTO: 1.5 K/UL
LYMPHOCYTES NFR BLD: 8 %
MAGNESIUM SERPL-MCNC: 1.4 MG/DL
MAGNESIUM SERPL-MCNC: 2.3 MG/DL
MCH RBC QN AUTO: 28 PG
MCHC RBC AUTO-ENTMCNC: 30.5 G/DL
MCV RBC AUTO: 92 FL
MONOCYTES # BLD AUTO: 1.9 K/UL
MONOCYTES NFR BLD: 10.2 %
NEUTROPHILS # BLD AUTO: 14 K/UL
NEUTROPHILS NFR BLD: 76.1 %
NRBC BLD-RTO: 0 /100 WBC
PHOSPHATE SERPL-MCNC: 3.5 MG/DL
PLATELET # BLD AUTO: 125 K/UL
PMV BLD AUTO: 12.7 FL
POCT GLUCOSE: 192 MG/DL (ref 70–110)
POCT GLUCOSE: 261 MG/DL (ref 70–110)
POCT GLUCOSE: 279 MG/DL (ref 70–110)
POCT GLUCOSE: 78 MG/DL (ref 70–110)
POTASSIUM SERPL-SCNC: 4 MMOL/L
PROT SERPL-MCNC: 4.7 G/DL
RBC # BLD AUTO: 3.72 M/UL
SODIUM SERPL-SCNC: 143 MMOL/L
WBC # BLD AUTO: 18.45 K/UL

## 2019-02-13 PROCEDURE — 84100 ASSAY OF PHOSPHORUS: CPT

## 2019-02-13 PROCEDURE — 80053 COMPREHEN METABOLIC PANEL: CPT

## 2019-02-13 PROCEDURE — 99233 SBSQ HOSP IP/OBS HIGH 50: CPT | Mod: GC,,, | Performed by: PSYCHIATRY & NEUROLOGY

## 2019-02-13 PROCEDURE — 63600175 PHARM REV CODE 636 W HCPCS: Performed by: NURSE PRACTITIONER

## 2019-02-13 PROCEDURE — 20000000 HC ICU ROOM

## 2019-02-13 PROCEDURE — 25000003 PHARM REV CODE 250: Performed by: NURSE PRACTITIONER

## 2019-02-13 PROCEDURE — 63600175 PHARM REV CODE 636 W HCPCS: Performed by: PHYSICIAN ASSISTANT

## 2019-02-13 PROCEDURE — A4216 STERILE WATER/SALINE, 10 ML: HCPCS | Performed by: NURSE PRACTITIONER

## 2019-02-13 PROCEDURE — 83735 ASSAY OF MAGNESIUM: CPT | Mod: 91

## 2019-02-13 PROCEDURE — 99233 PR SUBSEQUENT HOSPITAL CARE,LEVL III: ICD-10-PCS | Mod: ,,, | Performed by: PHYSICIAN ASSISTANT

## 2019-02-13 PROCEDURE — 92610 EVALUATE SWALLOWING FUNCTION: CPT

## 2019-02-13 PROCEDURE — 97161 PT EVAL LOW COMPLEX 20 MIN: CPT

## 2019-02-13 PROCEDURE — 97165 OT EVAL LOW COMPLEX 30 MIN: CPT

## 2019-02-13 PROCEDURE — 99233 SBSQ HOSP IP/OBS HIGH 50: CPT | Mod: ,,, | Performed by: PHYSICIAN ASSISTANT

## 2019-02-13 PROCEDURE — 83735 ASSAY OF MAGNESIUM: CPT

## 2019-02-13 PROCEDURE — 63600175 PHARM REV CODE 636 W HCPCS: Performed by: PSYCHIATRY & NEUROLOGY

## 2019-02-13 PROCEDURE — 94761 N-INVAS EAR/PLS OXIMETRY MLT: CPT

## 2019-02-13 PROCEDURE — 25000003 PHARM REV CODE 250: Performed by: PHYSICIAN ASSISTANT

## 2019-02-13 PROCEDURE — 92523 SPEECH SOUND LANG COMPREHEN: CPT

## 2019-02-13 PROCEDURE — 25000003 PHARM REV CODE 250: Performed by: PSYCHIATRY & NEUROLOGY

## 2019-02-13 PROCEDURE — 85025 COMPLETE CBC W/AUTO DIFF WBC: CPT

## 2019-02-13 PROCEDURE — 99233 PR SUBSEQUENT HOSPITAL CARE,LEVL III: ICD-10-PCS | Mod: GC,,, | Performed by: PSYCHIATRY & NEUROLOGY

## 2019-02-13 RX ORDER — MAGNESIUM SULFATE HEPTAHYDRATE 40 MG/ML
4 INJECTION, SOLUTION INTRAVENOUS ONCE
Status: DISCONTINUED | OUTPATIENT
Start: 2019-02-13 | End: 2019-02-13

## 2019-02-13 RX ORDER — HEPARIN SODIUM 5000 [USP'U]/ML
5000 INJECTION, SOLUTION INTRAVENOUS; SUBCUTANEOUS EVERY 8 HOURS
Status: DISCONTINUED | OUTPATIENT
Start: 2019-02-13 | End: 2019-02-15 | Stop reason: HOSPADM

## 2019-02-13 RX ORDER — SODIUM BICARBONATE 650 MG/1
650 TABLET ORAL 3 TIMES DAILY
Status: DISCONTINUED | OUTPATIENT
Start: 2019-02-13 | End: 2019-02-15 | Stop reason: HOSPADM

## 2019-02-13 RX ORDER — MAGNESIUM SULFATE HEPTAHYDRATE 40 MG/ML
2 INJECTION, SOLUTION INTRAVENOUS ONCE
Status: COMPLETED | OUTPATIENT
Start: 2019-02-13 | End: 2019-02-13

## 2019-02-13 RX ORDER — CARVEDILOL 25 MG/1
25 TABLET ORAL 2 TIMES DAILY
Status: DISCONTINUED | OUTPATIENT
Start: 2019-02-13 | End: 2019-02-15 | Stop reason: HOSPADM

## 2019-02-13 RX ORDER — CLONIDINE HYDROCHLORIDE 0.1 MG/1
0.2 TABLET ORAL 2 TIMES DAILY
Status: DISCONTINUED | OUTPATIENT
Start: 2019-02-13 | End: 2019-02-13

## 2019-02-13 RX ORDER — CLONIDINE HYDROCHLORIDE 0.1 MG/1
0.1 TABLET ORAL 2 TIMES DAILY
Status: DISCONTINUED | OUTPATIENT
Start: 2019-02-13 | End: 2019-02-14

## 2019-02-13 RX ORDER — HYDRALAZINE HYDROCHLORIDE 20 MG/ML
20 INJECTION INTRAMUSCULAR; INTRAVENOUS ONCE
Status: COMPLETED | OUTPATIENT
Start: 2019-02-13 | End: 2019-02-13

## 2019-02-13 RX ADMIN — HYDRALAZINE HYDROCHLORIDE 20 MG: 20 INJECTION INTRAMUSCULAR; INTRAVENOUS at 09:02

## 2019-02-13 RX ADMIN — CEFAZOLIN SODIUM 2 G: 2 SOLUTION INTRAVENOUS at 05:02

## 2019-02-13 RX ADMIN — MAGNESIUM SULFATE IN WATER 2 G: 40 INJECTION, SOLUTION INTRAVENOUS at 02:02

## 2019-02-13 RX ADMIN — CLONIDINE HYDROCHLORIDE 0.1 MG: 0.1 TABLET ORAL at 06:02

## 2019-02-13 RX ADMIN — ASPIRIN 325 MG ORAL TABLET 325 MG: 325 PILL ORAL at 08:02

## 2019-02-13 RX ADMIN — CARVEDILOL 25 MG: 25 TABLET, FILM COATED ORAL at 09:02

## 2019-02-13 RX ADMIN — ATORVASTATIN CALCIUM 40 MG: 20 TABLET, FILM COATED ORAL at 08:02

## 2019-02-13 RX ADMIN — HYDRALAZINE HYDROCHLORIDE 10 MG: 20 INJECTION INTRAMUSCULAR; INTRAVENOUS at 06:02

## 2019-02-13 RX ADMIN — INSULIN ASPART 6 UNITS: 100 INJECTION, SOLUTION INTRAVENOUS; SUBCUTANEOUS at 04:02

## 2019-02-13 RX ADMIN — ALLOPURINOL 200 MG: 100 TABLET ORAL at 08:02

## 2019-02-13 RX ADMIN — STANDARDIZED SENNA CONCENTRATE AND DOCUSATE SODIUM 1 TABLET: 8.6; 5 TABLET, FILM COATED ORAL at 08:02

## 2019-02-13 RX ADMIN — Medication 3 ML: at 09:02

## 2019-02-13 RX ADMIN — HEPARIN SODIUM 5000 UNITS: 5000 INJECTION, SOLUTION INTRAVENOUS; SUBCUTANEOUS at 09:02

## 2019-02-13 RX ADMIN — PIPERACILLIN AND TAZOBACTAM 4.5 G: 4; .5 INJECTION, POWDER, LYOPHILIZED, FOR SOLUTION INTRAVENOUS; PARENTERAL at 02:02

## 2019-02-13 RX ADMIN — HYDRALAZINE HYDROCHLORIDE 10 MG: 20 INJECTION INTRAMUSCULAR; INTRAVENOUS at 03:02

## 2019-02-13 RX ADMIN — SODIUM BICARBONATE 650 MG TABLET 650 MG: at 09:02

## 2019-02-13 RX ADMIN — Medication 3 ML: at 06:02

## 2019-02-13 RX ADMIN — SODIUM BICARBONATE 650 MG TABLET 650 MG: at 02:02

## 2019-02-13 RX ADMIN — Medication 3 ML: at 01:02

## 2019-02-13 RX ADMIN — PIPERACILLIN AND TAZOBACTAM 4.5 G: 4; .5 INJECTION, POWDER, LYOPHILIZED, FOR SOLUTION INTRAVENOUS; PARENTERAL at 09:02

## 2019-02-13 RX ADMIN — LABETALOL HYDROCHLORIDE 10 MG: 5 INJECTION, SOLUTION INTRAVENOUS at 05:02

## 2019-02-13 RX ADMIN — CARVEDILOL 12.5 MG: 12.5 TABLET, FILM COATED ORAL at 08:02

## 2019-02-13 RX ADMIN — HEPARIN SODIUM 5000 UNITS: 5000 INJECTION, SOLUTION INTRAVENOUS; SUBCUTANEOUS at 01:02

## 2019-02-13 RX ADMIN — INSULIN ASPART 6 UNITS: 100 INJECTION, SOLUTION INTRAVENOUS; SUBCUTANEOUS at 11:02

## 2019-02-13 NOTE — PT/OT/SLP EVAL
Occupational Therapy   Evaluation    Name: Michele Leach  MRN: [id number]  Admitting Diagnosis: Posterior reversible encephalopathy syndrome      Recommendations:     Discharge Recommendations: Home with HH  Discharge Equipment Recommendations:  none  Barriers to discharge:  Decreased caregiver support, Inaccessible home environment    Assessment:     Michele Leach is a 68 y.o. male with a medical diagnosis of Posterior reversible encephalopathy syndrome.  He presents with performance deficits including weakness, impaired endurance, impaired balance, impaired self care skills, impaired functional mobilty, impaired cardiopulmonary response to activity. Pt would continue to benefit from OT to improve functional independence and return to OF. Recommend HH upon D/C.    Rehab Prognosis: Good; patient would benefit from acute skilled OT services to address these deficits and reach maximum level of function.       Plan:     Patient to be seen 3 x/week to address the above listed problems via self-care/home management, therapeutic activities, therapeutic exercises  · Plan of Care Expires: 03/13/19  · Plan of Care Reviewed with: patient, son    Subjective     Chief Complaint: None stated  Patient/Family Comments/goals: Return home    Occupational Profile:  Living Environment: Pt lives with his 2 sons in 1-story house with 4 ANTHONY and walk-in shower with built-in seat.  Previous level of function: (I) with ADLs, uses straight cane for mobility, retired, driving  Equipment Used at Home:  cane, straight, BIPAP  Assistance upon Discharge: Sons work during the day and pt is home alone    Pain/Comfort:  · Pain Rating 1: 0/10  · Pain Rating Post-Intervention 1: 0/10    Patients cultural, spiritual, Scientology conflicts given the current situation: no    Objective:     Communicated with: RN prior to session. Patient found up in chair with blood pressure cuff, pulse ox (continuous), peripheral IV, telemetry, wilson catheter upon OT  entry to room, son present.    General Precautions: Standard, fall, vision impaired   Orthopedic Precautions:N/A   Braces: N/A     Occupational Performance:    Bed Mobility:    · NT, pt sitting up in chair    Functional Mobility/Transfers:  · Patient completed Sit <> Stand Transfer with contact guard assistance with hand-held assist 2 trials from EOB  · Functional Mobility: Few steps forward/backward x 2 trials with Min A hand-held assist, unsteady; O2 sats dropped to 91% on room air    Activities of Daily Living:  · Lower Body Dressing: contact guard assistance to don socks seated in bedside chair    Cognitive/Visual Perceptual:  Cognitive/Psychosocial Skills:     -       Oriented to: Person, Place, Time and Situation   -       Follows Commands/attention:Follows multistep commands  -       Communication: clear/fluent  -       Safety awareness/insight to disability: intact  Visual/Perceptual:      -Impaired--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    Physical Exam:  Balance:    -       good sitting, fair standing balance  Postural examination/scapula alignment:    -       No postural abnormalities identified  Dominant hand:    -       Right  Upper Extremity Range of Motion:     -       Right Upper Extremity: WFL  -       Left Upper Extremity: WFL  Upper Extremity Strength:    -       Right Upper Extremity: WFL, shld slightly weaker  -       Left Upper Extremity: WFL   Strength:    -       Right Upper Extremity: WFL  -       Left Upper Extremity: WFL  Fine Motor Coordination:    -       Intact    AMPAC 6 Click ADL:  AMPAC Total Score: 19    Treatment & Education:  OT eval; educated on OT role and POC and to call for assistance before getting up  Education:    Patient left up in chair with all lines intact, call button in reach, RN notified and son present    GOALS:   Multidisciplinary Problems     Occupational Therapy Goals        Problem: Occupational Therapy Goal    Goal Priority Disciplines Outcome Interventions   Occupational  Therapy Goal     OT, PT/OT Ongoing (interventions implemented as appropriate)    Description:  Goals to be met by: 7 days (2/20/19)     Patient will increase functional independence with ADLs by performing:    UE Dressing with Supervision.  LE Dressing with Supervision.  Grooming while standing at sink with Supervision.  Toileting from toilet with Supervision for hygiene and clothing management.   Supine to sit with Modified Dunn.  Toilet transfer to toilet with Supervision.  Pt will complete functional mobility household distance with SBA using AD as needed.                      History:     Past Medical History:   Diagnosis Date    Diabetes mellitus type 2 in obese     Encephalopathy acute 2/11/2019    HTN (hypertension), benign     Renal disease        History reviewed. No pertinent surgical history.    Time Tracking:     OT Date of Treatment: 02/13/19  OT Start Time: 1022  OT Stop Time: 1031  OT Total Time (min): 9 min    Billable Minutes:Evaluation 9 minutes    AMANDA Hanks  2/13/2019

## 2019-02-13 NOTE — PLAN OF CARE
Problem: Adult Inpatient Plan of Care  Goal: Patient-Specific Goal (Individualization)  Pt with no significant events overnight. Plan to extubate today. POC reviewed with pt and son, all concerns addressed. WCSUNITHA.

## 2019-02-13 NOTE — PROVIDER TRANSFER
Neuro Critical Care Transfer of Care note    Date of Admit: 2/11/2019  Date of Transfer / Stepdown: 2/13/2019    Brief History of Present Illness:      Michele Leach is a 68 y.o. male who  has a past medical history of Diabetes mellitus type 2 in obese, Encephalopathy acute (2/11/2019), HTN (hypertension), benign, and Renal disease.. The patient presented to [facility] on 2/11/2019 with a primary complaint of No chief complaint on file.      Mr Leach is a 69 yo male with a PMH of Liver disease, DM, CAD, HTN, R eye blindness, and facto VIII defecit who presents to [facility] for AMS. He was at home yesterday when he stood up to go to the bathroom and on the way vomited and became confused. EMS arrived to find the pt confused with SBP >200. He had a R gaze and L sided weakness. . CTA at OSH showed no flow limiting stenosis or LVO. He was unresponsive upon arrival to OSH and was intubated in ED. He is being admitted to [facility] for a higher level of care.      Mr Leach is a 69 yo male with a PMH of Liver disease, DM, CAD, HTN, R eye blindness, and factor VIII deficiency who presents to [facility] for AMS. He was at home yesterday when he stood up to go to the bathroom and on the way vomited and became confused. EMS arrived to find the pt confused with SBP >200. He had a R gaze and L sided weakness. . CTA at OSH showed no flow limiting stenosis or LVO. He was unresponsive upon arrival to OSH and was intubated in ED.    2/11: Admit NCC. MRI suggestive of PRES  2/12: extubation, off insulin gtt          Hospital Course By Problem with Pertinent Findings:     1. Encephalopathy, PRES  -Likely 2/2 to recent chemotherapy treatment vs hypertension   - Patient had recent round of chemotherapy as treatment for Factor VII deficiency. He is being followed by oncologist Dr. Medeiros at [facility]   - Blood pressure controlled and airway secured in acute period  - Extubated yesterday afternoon (2/12) without  complication, tolerating RA  - No further treatment required from NCC standpoint, continue to control BP, goal <180  - Vascular Neurology following patient and will continue to follow upon stepdown     2. Aspiration PNA  - Reported aspiration during acute decompensation in the field  - E coli in sputum  - Broad spectrum ABX on admission  - Now narrowed to cefazolin based on cultures and sensitivities  - Tolerating RA   - Spiked fever yesterday, well controlled with tylenol   - No signs of sepsis     3. Hypertension  - BP control with nicardipine initially, now with oral meds carvedilol    4. CKD4  - Baseline dysfunction  - Making adequate urine   - Home bicarb tablets     5. Factor VIII deficiency  - Followed by heme/onc in [place], Dr. Montoya   - No bleeding concerns during ICU stay     Consultants and Procedures:     Consultants:  Vascular Neurology     Procedures:    None     Transfer Information:     Diet:  ADAT     Physical Activity:  PT/OT /SLP ordered     To Do / Pending Studies / Follow ups:  - Continue course of ABX  - Therapy/placement       Amelia Santos  Neuro Crtical Care

## 2019-02-13 NOTE — PLAN OF CARE
Problem: Adult Inpatient Plan of Care  Goal: Plan of Care Review  Outcome: Ongoing (interventions implemented as appropriate)  Plan of care reviewed with patient and family. All questions answered. VSS throughout shift. Required one dose of both IV Hydralazine and IV Labetolol for SBP >180. Continuing Q1 hour neuro checks. A&Ox4 with no deficits. OOB to chair with assist of 1. No complaints of pain. Will continue to monitor.

## 2019-02-13 NOTE — PROGRESS NOTES
[facility]  Neurocritical Care  Progress Note    Admit Date: 2/11/2019  Service Date: 02/12/2019  Length of Stay: 1    Subjective:     Chief Complaint: Posterior reversible encephalopathy syndrome    History of Present Illness: Mr Leach is a 67 yo male with a PMH of Liver disease, DM, CAD, HTN, R eye blindness, and facto VIII defecit who presents to [facility] for AMS. He was at home yesterday when he stood up to go to the bathroom and on the way vomited and became confused. EMS arrived to find the pt confused with SBP >200. He had a R gaze and L sided weakness. . CTA at OSH showed no flow limiting stenosis or LVO. He was unresponsive upon arrival to OSH and was intubated in ED. He is being admitted to [facility] for a higher level of care.     Hospital Course: 2/11: Admit NCC   2/12: extubation, off insulin gtt    Interval History: Patient stable overnight on Precedex. Following commands, tolerating SPONT. Patient extubated this afternoon. Passed bedside nursing swallowing evaluation. Begin diet, stop insulin gtt, start SSI.     Review of Systems: Unable to obtain a complete ROS due to intubated.     Vitals:   Temp: 98.4 °F (36.9 °C)  Pulse: (!) 119  Rhythm: sinus tachycardia  BP: (!) 165/73  MAP (mmHg): 105  Resp: (!) 31  SpO2: 98 %  Oxygen Concentration (%): 40  O2 Device (Oxygen Therapy): room air  Vent Mode: A/C  Set Rate: 14 bmp  Vt Set: 500 mL  Pressure Support: 10 cmH20  PEEP/CPAP: 5 cmH20  Peak Airway Pressure: 22 cmH2O  Mean Airway Pressure: 7.8 cmH20  Plateau Pressure: 19 cmH20    Temp  Min: 97 °F (36.1 °C)  Max: 99 °F (37.2 °C)  Pulse  Min: 58  Max: 125  BP  Min: 102/50  Max: 195/84  MAP (mmHg)  Min: 72  Max: 138  Resp  Min: 13  Max: 38  SpO2  Min: 96 %  Max: 100 %  Oxygen Concentration (%)  Min: 40  Max: 40    02/11 0701 - 02/12 0700  In: 2677.9 [I.V.:2377.9]  Out: 785 [Urine:785]   Unmeasured Output  Stool Occurrence: 0     Examination:   Constitutional: Well-nourished and -developed. No  apparent distress.   Eyes: Conjunctiva clear, anicteric. Lids no lesions.  Head/Ears/Nose/Mouth/Throat/Neck: Moist mucous membranes. External ears, nose atraumatic.   Cardiovascular: Regular rhythm. No murmurs. No leg edema.  Respiratory: Comfortable respirations. Clear to auscultation.  Gastrointestinal: No hernia. Soft, nondistended, nontender. + bowel sounds.    Neurologic:  -GCS [id number]  -Alert. Nods appropriately. Follows commands.  -Cranial nerves intact  -Motor no focal deficits   -Sensation intact     Medications:   Continuous  sodium chloride 0.9% Last Rate: 100 mL/hr at 02/12/19 1800   Scheduled  [START ON 2/13/2019] allopurinol 200 mg Daily   aspirin 325 mg Daily   atorvastatin 40 mg Daily   carvedilol 12.5 mg BID   magnesium sulfate IVPB 2 g Once   piperacillin-tazobactam 4.5 g in sodium chloride 0.9% 100 mL IVPB (ready to mix system) 4.5 g Q8H   senna-docusate 8.6-50 mg 1 tablet Daily   sodium chloride 0.9% 3 mL Q8H   PRN  dextrose 50% 12.5 g PRN   dextrose 50% 25 g PRN   glucagon (human recombinant) 1 mg PRN   glucose 16 g PRN   glucose 24 g PRN   hydrALAZINE 10 mg Q8H PRN   insulin aspart U-100 1-10 Units QID (AC + HS) PRN   labetalol 10 mg Q4H PRN   ondansetron 4 mg Q8H PRN      Today I independently reviewed pertinent medications, lines/drains/airways, cardiology results, laboratory results, notably:     ISTAT: Recent Labs   Lab 02/12/19  1118   PH 7.357   PCO2 33.6*   PO2 154*   POCSATURATED 99   HCO3 18.9*   BE -7   POCTCO2 20*   SAMPLE ARTERIAL      Chem: Recent Labs   Lab 02/12/19  0343 02/12/19  1729     --    K 4.6  --    *  --    CO2 16*  --    GLU 87  --    BUN 50*  --    CREATININE 3.0*  --    ESTGFRAFRICA 23.9*  --    EGFRNONAA 20.7*  --    CALCIUM 7.9*  --    MG 1.2* 0.9*   PHOS 4.8*  --    ANIONGAP 11  --    PROT 5.4*  --    ALBUMIN 2.3*  --    BILITOT 1.4*  --    ALKPHOS 99  --    AST 23  --    ALT 19  --      Heme: Recent Labs   Lab 02/12/19  0343   WBC 23.89*   HGB 11.9*    HCT 38.5*   *     Endo:   Recent Labs   Lab 02/12/19  1608 02/12/19  1706 02/12/19  1707   POCTGLUCOSE 92 66* 92        Assessment/Plan:     Neuro   * Posterior reversible encephalopathy syndrome    Likely etiology of acute encephalopathy, may be from chemotherapy vs HTN  - MRI completed  - Extubated today  - vascular Neurology following  - SBP goal 100-180  - Continue hourly neuro checks   - Vascular Neurology following   - PT/OT/SLP ordered post-extubation      Encephalopathy acute    See PRES      Pulmonary   Aspiration into respiratory tract    - Report of aspiration prior to intubation   - Respiratory culture growing Ecoli, sensitivities pending   - Discontinue vancomycin, continue zosyn until cultures finalized  - Tolerating extubation well, on RA   - Leukocytosis persists      Cardiac/Vascular   Essential hypertension    SBP goal 100-180   - Begin oral anithypertensives  - Echo and EKG      Renal/   Stage 4 chronic kidney disease    Baseline kidney dysfunction   - Daily CMP  - IVF  - Judicious electrolyte replacement  - Monitor urine output      Endocrine   Type 2 diabetes mellitus with hyperglycemia, without long-term current use of insulin    Insulin gtt started on admission for BG >400  - Hemoglobin A1c 8.2  - Off insulin gtt overnight  - Begin diet as patient has passed bedside swallowing assessment  - SSI            The patient is being Prophylaxed for:  Venous Thromboembolism with: Mechanical or Chemical  Stress Ulcer with: H2B  Ventilator Pneumonia with: chlorhexidine oral care    Activity Orders          None        Full Code    Amelia Santos PA-C  Neurocritical Care  [facility]

## 2019-02-13 NOTE — MEDICAL/APP STUDENT
Hospital Medicine ICU Acceptance Note    Date of Admit: 2/11/2019  Date of Transfer / Stepdown: 2/13/2019  Niya, LEROY/J, L, Onc (IV chemo w/in 1 month), Gyn/Onc, or other special case?: no   ICU team stepping patient down: [facility]  ICU team member giving verbal handoff: Amelia ROMERO  Accepting  team: D    Brief History of Present Illness:      Pt is a 68 y.o. male with a history of renal disease, DM2, CAD, HTN, right eye blindness, and factor VIII deficiency who presents with altered mental status. At home, pt vomited and became confused. EMS noted SBP > 200. Presented with right sided gaze and left sided weakness. Intubated in the ED and admitted to the [facility].    Hospital/ICU Course:     Pt was intubated in the ED and extubated 1 day later. BP controlled with nicardipine gtt, then switched to PO coreg. Received empiric zosyn and vancomycin for suspected aspiration in the field, later changed to cephazolin. Vascular neurology initially suspected right MCA stroke, but MRI findings later suggested PRES. Vascular neurology recommended no procedural intervention. Pt was intubated and was stable overnight on precedex, and so was extubated after 1 day. SLP determined no speech needs.    Consultants and Procedures:     Consultants:  none    Procedures:    Intubation  ABG draw    Transfer Information:     Diet:  renal    Physical Activity:    To Do / Pending Studies / Follow ups:    Posterior reversible encephalopathy syndrome  · ddx recent chemotherapy for factor VIII deficiency vs hypertension  · Per [facility], BP goal < 180  · Continue atorvastatin, ASA, and heparin  · Vascular neurology following     Aspiration pneumonia  · Reported aspiration during acute decompensation in the field, CXR shows no consolidation  · Respiratory culture shows E. coli, treating with cephazolin     Hypertension  · coreg     Acute on chronic kidney disease  · prerenal CLAUDIO on baseline dysfunction  · Gentle hydration  · Trending down, monitor  Cr  · Continue home bicarbonate     Diabetes mellitus type 2  · HgbA1C 8.2 (2/11)  · POCT, LDSSI    Factor VIII deficiency  · Followed by heme/onc in [place], Dr. Montoya   · No bleeding concerns during ICU stay     Procedures:      Patient has been accepted by Hospital Medicine Team D, who will assume care of the patient upon arrival to the floor from the ICU. Please contact ICU team with any concerns prior to arrival. Please contact [facility] Medicine at [phone] or [phone] (please do NOT leave a voicemail) when patient arrives to the floor.    I personally scribed for Daisy Sky MD on 02/13/2019 at 4:58 PM. Electronically signed by gio Rojas on 02/13/2019 at 4:58 PM

## 2019-02-13 NOTE — PT/OT/SLP EVAL
Speech Language Pathology Evaluation  Cognitive/Bedside Swallow  Discharge    Patient Name:  Michele Leach   MRN:  [id number]  Admitting Diagnosis: Posterior reversible encephalopathy syndrome    Recommendations:                  General Recommendations:  Follow-up not indicated  Diet recommendations:  Regular, Thin   Aspiration Precautions: Small bites/sips and Standard aspiration precautions   General Precautions: Standard, fall  Communication strategies:  none    History:     Past Medical History:   Diagnosis Date    Diabetes mellitus type 2 in obese     Encephalopathy acute 2/11/2019    HTN (hypertension), benign     Renal disease        History reviewed. No pertinent surgical history.    Social History: Patient lives with two sons.    Prior Intubation HX:  2/12    Prior diet: regular/thin.      Subjective     Awake/alert      Pain/Comfort:  · Pain Rating 1: 0/10  · Pain Rating Post-Intervention 1: 0/10    Objective:     Cognitive Status:    Orientation Oriented x4  Memory Immediate Recall 5 numbers/5 words  Problem Solving Sequencing 4/4 word set and Compare/contrast 100%   Reasoning 100%     Receptive Language:   Comprehension:      Questions Complex yes/no 100%  Commands  complex/abstract commands 100%    Pragmatics:    WFL    Expressive Language:  Verbal:    Verbal language skills were wfl with no evidence of aphasia.  Pt. Expressed their thoughts coherently in conversation with no evidence of confusion or word finding deficits  Nonverbal:   WFL      Motor Speech:  WFL    Voice:   WFL    Visual-Spatial:  WFL    Reading:   WFL       Oral Musculature Evaluation  · Oral Musculature: WFL  · Dentition: present and adequate  · Oral Labial Strength and Mobility: WFL  · Lingual Strength and Mobility: WFL  · Volitional Cough: adequate  · Volitional Swallow: timely  · Voice Prior to PO Intake: clear    Bedside Swallow Eval:   Consistencies Assessed:  · Thin liquids x5 oz thin via cup/straw  · Solids x1/2 pratibha  cracker       Oral Phase:   · WFL    Pharyngeal Phase:   · no overt clinical signs/symptoms of aspiration    Compensatory Strategies  · None      Assessment:     Michele Leach is a 68 y.o. male with cognitive, linguistic, and swallowing aspects deemed WFL at this time. No further ST recommended.     Goals:   Multidisciplinary Problems     SLP Goals        Problem: SLP Goal    Goal Priority Disciplines Outcome   SLP Goal     SLP                    Plan:     · Plan of Care reviewed with:  patient, son   · SLP Follow-Up:  No       Discharge recommendations:    No St f/u      Time Tracking:     SLP Treatment Date:   02/13/19  Speech Start Time:  0948  Speech Stop Time:  1004     Speech Total Time (min):  16 min    Billable Minutes: Eval 8  and Eval Swallow and Oral Function 8    Kaitlin Sandoval CCC-SLP  02/13/2019

## 2019-02-13 NOTE — PLAN OF CARE
Problem: SLP Goal  Goal: SLP Goal  Speech language cognitive eval and bedside swallow study completed. No further ST recommended at this time. Please see note for details.   Kaitlin Sandoval, TAMIKA-SLP  2/13/2019

## 2019-02-13 NOTE — PLAN OF CARE
Problem: Physical Therapy Goal  Goal: Physical Therapy Goal    Goals to be met by 2/20/2019    1. Pt will perform rolling to the R and L with independently.   2. Pt will perform supine to sit from both sides of the bed with independently.  3. Pt will perform sit to supine with independently.  4. Pt will perform sit to stand transfers with independently.    5. Pt will perform bed <> chair transfers with stand step technique independently.  6. Pt will perform gait x 150 feet with SBA and least restrictive AD.  7. Pt will stand  x 15 minutes with AD and SBA without LOB while performing dynamic UE tasks to prepare for functional tasks in standing.     Outcome: Ongoing (interventions implemented as appropriate)  Patient participated well in therapy.  POC and goals are appropriate for current level of function.  Please refer to the progress note for functional mobility.     Pt is safe to perform transfers and ambulation in hallway with nursing using cane and min A x1.     Justa James, SPT  2/13/2019

## 2019-02-13 NOTE — PLAN OF CARE
Problem: Adult Inpatient Plan of Care  Goal: Plan of Care Review  Outcome: Ongoing (interventions implemented as appropriate)  No significant events overnight. PRN hydralazine administered x 2. Plan to step down today. POC discussed with pt in full detail, all concerns addressed. WCTM.

## 2019-02-13 NOTE — ASSESSMENT & PLAN NOTE
- Report of aspiration prior to intubation   - Respiratory culture growing Ecoli  - Zosyn narrowed to Ancef based on sensitivities   - Tolerating extubation well, on RA   - Leukocytosis improving

## 2019-02-13 NOTE — SUBJECTIVE & OBJECTIVE
Neurologic Chief Complaint: Right sided weakness, resolved    Subjective:     Interval History: Patient is seen for follow-up neurological assessment and treatment recommendations: PRES    HPI, Past Medical, Family, and Social History remains the same as documented in the initial encounter.     Review of Systems   Constitutional: Negative for chills and fever.   Respiratory: Negative for cough and shortness of breath.         Copious mucus production   Cardiovascular: Negative for chest pain.   Gastrointestinal: Negative for abdominal pain, diarrhea, nausea and vomiting.   Neurological: Negative for headaches.     Scheduled Meds:   allopurinol  200 mg Oral Daily    aspirin  325 mg Per NG tube Daily    atorvastatin  40 mg Oral Daily    carvedilol  12.5 mg Oral BID    magnesium sulfate IVPB  4 g Intravenous Once    piperacillin-tazobactam 4.5 g in sodium chloride 0.9% 100 mL IVPB (ready to mix system)  4.5 g Intravenous Q8H    senna-docusate 8.6-50 mg  1 tablet Per NG tube Daily    sodium chloride 0.9%  3 mL Intravenous Q8H     Continuous Infusions:   sodium chloride 0.9% 100 mL/hr at 02/13/19 0900     PRN Meds:acetaminophen, dextrose 50%, dextrose 50%, glucagon (human recombinant), glucose, glucose, hydrALAZINE, insulin aspart U-100, labetalol, ondansetron    Objective:     Vital Signs (Most Recent):  Temp: 98.2 °F (36.8 °C) (02/13/19 0705)  Pulse: 93 (02/13/19 0900)  Resp: 20 (02/13/19 0900)  BP: (!) 161/67 (02/13/19 0900)  SpO2: 97 % (02/13/19 0900)  BP Location: Left arm    Vital Signs Range (Last 24H):  Temp:  [97 °F (36.1 °C)-101.7 °F (38.7 °C)]   Pulse:  []   Resp:  [16-38]   BP: (106-208)/()   SpO2:  [93 %-100 %]   BP Location: Left arm    Physical Exam   Constitutional: He is oriented to person, place, and time. He appears well-developed and well-nourished. No distress.   HENT:   Head: Normocephalic and atraumatic.   Eyes: EOM are normal. Pupils are equal, round, and reactive to light. No  scleral icterus.   Neck: Normal range of motion. Neck supple. No JVD present.   Cardiovascular: Normal rate, regular rhythm, normal heart sounds and intact distal pulses.   No murmur heard.  Pulmonary/Chest: Effort normal and breath sounds normal. No respiratory distress. He has no wheezes.   Abdominal: Soft. Bowel sounds are normal. He exhibits no distension. There is no tenderness.   Musculoskeletal: Normal range of motion. He exhibits edema.   Neurological: He is alert and oriented to person, place, and time. No cranial nerve deficit.   Skin: Skin is warm. Capillary refill takes less than 2 seconds. He is not diaphoretic. No erythema.   Psychiatric: He has a normal mood and affect.   Vitals reviewed.      Neurological Exam:   LOC: alert  Attention Span: Good   Language: No aphasia  Articulation: No dysarthria  Orientation: Person, Place, Time   Visual Fields: Full in left eye. 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  EOM (CN III, IV, VI): Full/intact  Pupils (CN II, III): PERRL  Anisocoria Side: R pupil - mm Reactive: No   Facial Sensation (CN V): Normal  Facial Movement (CN VII): Symmetric facial expression      Laboratory:  CMP:   Recent Labs   Lab 02/13/19  0611   CALCIUM 7.1*   ALBUMIN 1.9*   PROT 4.7*      K 4.0   CO2 15*   *   BUN 56*   CREATININE 2.6*   ALKPHOS 84   ALT 16   AST 24   BILITOT 0.8     CBC:   Recent Labs   Lab 02/13/19  0611   WBC 18.45*   RBC 3.72*   HGB 10.4*   HCT 34.1*   *   MCV 92   MCH 28.0   MCHC 30.5*     Lipid Panel:   Recent Labs   Lab 02/11/19  0655   CHOL 105*   LDLCALC 49.0*   HDL 29*   TRIG 135     Hgb A1C:   Recent Labs   Lab 02/11/19  0655   HGBA1C 8.2*       Diagnostic Results   No new imaging

## 2019-02-13 NOTE — PROGRESS NOTES
[facility]  Vascular Neurology  Comprehensive Stroke Center  Progress Note    Assessment/Plan:     * Posterior reversible encephalopathy syndrome    67 y/o male with AMS and hypertensive urgency with LSW, AMS and right gaze upon initial presentation     Antithrombotics:  mg daily    Statin: Lipitor 40 mg daily    Therapy: PT/OT/ST    Diagnostics: MRI brain, 2D Echo, lipid panel, HgB A1C, TSH    Risk factor Modifications: HTN, DM, renal disease      MRI showing findings consistent with PRES  Concomitant prerenal CLAUDIO and HTN at admission with SBP >200     Rec:  -Continue tight BP control with SBP <160  - mg QD  -Lipitor 40 mg QD   -Neuro checks q4  -will continue to follow patient          Essential hypertension    Stroke risk factor  SBP <160    -Avoid overcorrecting hypertension     Type 2 diabetes mellitus with hyperglycemia, without long-term current use of insulin    Stroke risk factor    -Last A1c reviewed-   Lab Results   Component Value Date    HGBA1C 8.2 (H) 02/11/2019       Home Antihyperglycemic Regiment:  -unknown at this time    Inpatient Antihyperglycemic Regiment:   Antihyperglycemics (From admission, onward)    Start     Stop Route Frequency Ordered    02/12/19 1810  insulin aspart U-100 pen 1-10 Units      -- SubQ Before meals & nightly PRN 02/12/19 1710            Blood Sugars (AccuCheck):    Recent Labs     02/12/19  1500 02/12/19  1608 02/12/19  1706 02/12/19  1707 02/12/19  2208 02/13/19  0807   POCTGLUCOSE 94 92 66* 92 103 78                Encephalopathy acute    See PRES            02/12/2019: YULIA, patient's BP in more acceptable range. Patient remains intubated and sedated on Precedex  02/13/2019: Patient extubated by primary service, physical exam returned back to baseline. Patient continues to be covered on ABx for suspected aspiration PNA    STROKE DOCUMENTATION        NIH Scale:  1a. Level of Consciousness: 0-->Alert, keenly responsive  1b. LOC  Questions: 0-->Answers both questions correctly  1c. LOC Commands: 0-->Performs both tasks correctly  2. Best Gaze: 0-->Normal  3. Visual: 0-->No visual loss  4. Facial Palsy: 0-->Normal symmetrical movements  5a. Motor Arm, Left: 0-->No drift, limb holds 90 (or 45) degrees for full 10 secs  5b. Motor Arm, Right: 0-->No drift, limb holds 90 (or 45) degrees for full 10 secs  6a. Motor Leg, Left: 0-->No drift, leg holds 30 degree position for full 5 secs  6b. Motor Leg, Right: 0-->No drift, leg holds 30 degree position for full 5 secs  7. Limb Ataxia: 0-->Absent  8. Sensory: 0-->Normal, no sensory loss  9. Best Language: 0-->No aphasia, normal  10. Dysarthria: 0-->Normal  11. Extinction and Inattention (formerly Neglect): 0-->No abnormality  Total (NIH Stroke Scale): 0       Modified [place] Score: 0  [place] Coma Scale:    ABCD2 Score:    [id number] Score:   HAS -BLED Score:   ICH Score:   Hunt & Crowder Classification:      Hemorrhagic change of an Ischemic Stroke: Does this patient have an ischemic stroke with hemorrhagic changes? No     Neurologic Chief Complaint: Right sided weakness, resolved    Subjective:     Interval History: Patient is seen for follow-up neurological assessment and treatment recommendations: PRES    HPI, Past Medical, Family, and Social History remains the same as documented in the initial encounter.     Review of Systems   Constitutional: Negative for chills and fever.   Respiratory: Negative for cough and shortness of breath.         Copious mucus production   Cardiovascular: Negative for chest pain.   Gastrointestinal: Negative for abdominal pain, diarrhea, nausea and vomiting.   Neurological: Negative for headaches.     Scheduled Meds:   allopurinol  200 mg Oral Daily    aspirin  325 mg Per NG tube Daily    atorvastatin  40 mg Oral Daily    carvedilol  12.5 mg Oral BID    magnesium sulfate IVPB  4 g Intravenous Once    piperacillin-tazobactam 4.5 g in sodium chloride 0.9% 100 mL IVPB  (ready to mix system)  4.5 g Intravenous Q8H    senna-docusate 8.6-50 mg  1 tablet Per NG tube Daily    sodium chloride 0.9%  3 mL Intravenous Q8H     Continuous Infusions:   sodium chloride 0.9% 100 mL/hr at 02/13/19 0900     PRN Meds:acetaminophen, dextrose 50%, dextrose 50%, glucagon (human recombinant), glucose, glucose, hydrALAZINE, insulin aspart U-100, labetalol, ondansetron    Objective:     Vital Signs (Most Recent):  Temp: 98.2 °F (36.8 °C) (02/13/19 0705)  Pulse: 93 (02/13/19 0900)  Resp: 20 (02/13/19 0900)  BP: (!) 161/67 (02/13/19 0900)  SpO2: 97 % (02/13/19 0900)  BP Location: Left arm    Vital Signs Range (Last 24H):  Temp:  [97 °F (36.1 °C)-101.7 °F (38.7 °C)]   Pulse:  []   Resp:  [16-38]   BP: (106-208)/()   SpO2:  [93 %-100 %]   BP Location: Left arm    Physical Exam   Constitutional: He is oriented to person, place, and time. He appears well-developed and well-nourished. No distress.   HENT:   Head: Normocephalic and atraumatic.   Eyes: EOM are normal. Pupils are equal, round, and reactive to light. No scleral icterus.   Neck: Normal range of motion. Neck supple. No JVD present.   Cardiovascular: Normal rate, regular rhythm, normal heart sounds and intact distal pulses.   No murmur heard.  Pulmonary/Chest: Effort normal and breath sounds normal. No respiratory distress. He has no wheezes.   Abdominal: Soft. Bowel sounds are normal. He exhibits no distension. There is no tenderness.   Musculoskeletal: Normal range of motion. He exhibits edema.   Neurological: He is alert and oriented to person, place, and time. No cranial nerve deficit.   Skin: Skin is warm. Capillary refill takes less than 2 seconds. He is not diaphoretic. No erythema.   Psychiatric: He has a normal mood and affect.   Vitals reviewed.      Neurological Exam:   LOC: alert  Attention Span: Good   Language: No aphasia  Articulation: No dysarthria  Orientation: Person, Place, Time   Visual Fields: Full in left eye.  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  EOM (CN III, IV, VI): Full/intact  Pupils (CN II, III): PERRL  Anisocoria Side: R pupil - mm Reactive: No   Facial Sensation (CN V): Normal  Facial Movement (CN VII): Symmetric facial expression      Laboratory:  CMP:   Recent Labs   Lab 02/13/19  0611   CALCIUM 7.1*   ALBUMIN 1.9*   PROT 4.7*      K 4.0   CO2 15*   *   BUN 56*   CREATININE 2.6*   ALKPHOS 84   ALT 16   AST 24   BILITOT 0.8     CBC:   Recent Labs   Lab 02/13/19  0611   WBC 18.45*   RBC 3.72*   HGB 10.4*   HCT 34.1*   *   MCV 92   MCH 28.0   MCHC 30.5*     Lipid Panel:   Recent Labs   Lab 02/11/19  0655   CHOL 105*   LDLCALC 49.0*   HDL 29*   TRIG 135     Hgb A1C:   Recent Labs   Lab 02/11/19  0655   HGBA1C 8.2*       Diagnostic Results   No new imaging        Otis Soler MD  Comprehensive Stroke Center  Department of Vascular Neurology   [facility]

## 2019-02-13 NOTE — ASSESSMENT & PLAN NOTE
Stroke risk factor    -Last A1c reviewed-   Lab Results   Component Value Date    HGBA1C 8.2 (H) 02/11/2019       Home Antihyperglycemic Regiment:  -unknown at this time    Inpatient Antihyperglycemic Regiment:   Antihyperglycemics (From admission, onward)    Start     Stop Route Frequency Ordered    02/12/19 1810  insulin aspart U-100 pen 1-10 Units      -- SubQ Before meals & nightly PRN 02/12/19 1710            Blood Sugars (AccuCheck):    Recent Labs     02/12/19  1500 02/12/19  1608 02/12/19  1706 02/12/19  1707 02/12/19  2208 02/13/19  0807   POCTGLUCOSE 94 92 66* 92 103 78

## 2019-02-13 NOTE — PROGRESS NOTES
[facility]  Neurocritical Care  Progress Note    Admit Date: 2/11/2019  Service Date: 02/13/2019  Length of Stay: 2    Subjective:     Chief Complaint: Posterior reversible encephalopathy syndrome    History of Present Illness: Mr Leach is a 69 yo male with a PMH of Liver disease, DM, CAD, HTN, R eye blindness, and facto VIII defecit who presents to [facility] for AMS. He was at home yesterday when he stood up to go to the bathroom and on the way vomited and became confused. EMS arrived to find the pt confused with SBP >200. He had a R gaze and L sided weakness. . CTA at OSH showed no flow limiting stenosis or LVO. He was unresponsive upon arrival to OSH and was intubated in ED. He is being admitted to [facility] for a higher level of care.     Hospital Course: 2/11: Admit NCC   2/12: extubation, off insulin gtt  2/13: Tolerating RA, narrow ABX, TTF under HM     Interval History:     Review of Systems: Tolerating RA, extubated yesterday. BG well controlled with just SSI. Neuro exam at baseline. Narrow ABX based on culture sensitivities. TTF under Medicine service.     Vitals:   Temp: 97.9 °F (36.6 °C)  Pulse: 92  Rhythm: normal sinus rhythm  BP: (!) 188/78(gave IV Hydralyzine)  MAP (mmHg): 112  Resp: 18  SpO2: 97 %  O2 Device (Oxygen Therapy): room air    Temp  Min: 97.9 °F (36.6 °C)  Max: 101.7 °F (38.7 °C)  Pulse  Min: 74  Max: 127  BP  Min: 106/50  Max: 208/85  MAP (mmHg)  Min: 72  Max: 122  Resp  Min: 17  Max: 31  SpO2  Min: 93 %  Max: 99 %    02/12 0701 - 02/13 0700  In: 3137.3 [P.O.:100; I.V.:2487.3]  Out: 1560 [Urine:1560]   Unmeasured Output  Stool Occurrence: 1     Examination:   Constitutional: Well-nourished and -developed. No apparent distress.   Eyes: Conjunctiva clear, anicteric. Lids no lesions.  Head/Ears/Nose/Mouth/Throat/Neck: Moist mucous membranes. External ears, nose atraumatic.   Cardiovascular: Regular rhythm. No murmurs. No leg edema.  Respiratory: Comfortable respirations. Clear to  auscultation.  Gastrointestinal: No hernia. Soft, nondistended, nontender. + bowel sounds.    Neurologic:  -GCS [id number]  -Alert. Oriented to person, place, and time. Speech fluent. Follows commands.  -Cranial nerves intact  -Motor without focal deficits   -Sensation intact   Medications:   Continuous  sodium chloride 0.9% Last Rate: 50 mL/hr at 02/13/19 1600   Scheduled  allopurinol 200 mg Daily   aspirin 325 mg Daily   atorvastatin 40 mg Daily   carvedilol 12.5 mg BID   ceFAZolin (ANCEF) IVPB 2 g Q12H   heparin (porcine) 5,000 Units Q8H   senna-docusate 8.6-50 mg 1 tablet Daily   sodium bicarbonate 650 mg TID   sodium chloride 0.9% 3 mL Q8H   PRN  acetaminophen 650 mg Q6H PRN   dextrose 50% 12.5 g PRN   dextrose 50% 25 g PRN   glucagon (human recombinant) 1 mg PRN   glucose 16 g PRN   glucose 24 g PRN   hydrALAZINE 10 mg Q8H PRN   insulin aspart U-100 1-10 Units QID (AC + HS) PRN   labetalol 10 mg Q4H PRN   ondansetron 4 mg Q8H PRN      Today I independently reviewed pertinent medications, lines/drains/airways, imaging, laboratory results, notably:     ISTAT: No results for input(s): PH, PCO2, PO2, POCSATURATED, HCO3, BE, POCNA, POCK, POCTCO2, POCGLU, POCICA, POCLAC, SAMPLE in the last 24 hours.   Chem: Recent Labs   Lab 02/13/19  0611      K 4.0   *   CO2 15*   GLU 79   BUN 56*   CREATININE 2.6*   ESTGFRAFRICA 28.4*   EGFRNONAA 24.6*   CALCIUM 7.1*   MG 2.3   PHOS 3.5   ANIONGAP 9   PROT 4.7*   ALBUMIN 1.9*   BILITOT 0.8   ALKPHOS 84   AST 24   ALT 16     Heme: Recent Labs   Lab 02/13/19  0611   WBC 18.45*   HGB 10.4*   HCT 34.1*   *     Endo:   Recent Labs   Lab 02/13/19  0807 02/13/19  1121 02/13/19  1629   POCTGLUCOSE 78 279* 261*        Assessment/Plan:     Neuro   * Posterior reversible encephalopathy syndrome    Likely etiology of acute encephalopathy, may be from chemotherapy vs HTN  - MRI completed  - Extubated 2/12  - Vascular Neurology following  - SBP goal 100-180  - PT/OT/SLP   - Back  to baseline neuro function   - TTF under Hospital Medicine      Encephalopathy acute    See PRES      Pulmonary   Aspiration into respiratory tract    - Report of aspiration prior to intubation   - Respiratory culture growing Ecoli  - Zosyn narrowed to Ancef based on sensitivities   - Tolerating extubation well, on RA   - Leukocytosis improving      Cardiac/Vascular   Essential hypertension    SBP goal 100-180   - Carvedilol   - Echo and EKG      Renal/   Stage 4 chronic kidney disease    Baseline kidney dysfunction   - Daily CMP  - IVF  - Judicious electrolyte replacement  - Monitor urine output   - Begin home oral bicarbonate      Endocrine   Type 2 diabetes mellitus with hyperglycemia, without long-term current use of insulin    Insulin gtt started on admission for BG >400  - Hemoglobin A1c 8.2  - Off insulin gtt x1 day  - Diabetic diet   - Well controlled on SSI            The patient is being Prophylaxed for:  Venous Thromboembolism with: Mechanical or Chemical  Stress Ulcer with: Not Applicable   Ventilator Pneumonia with: not applicable    Activity Orders          None        Full Code    Amelia Santos PA-C  Neurocritical Care  [facility]

## 2019-02-13 NOTE — PLAN OF CARE
Problem: Occupational Therapy Goal  Goal: Occupational Therapy Goal  Goals to be met by: 7 days (2/20/19)     Patient will increase functional independence with ADLs by performing:    UE Dressing with Supervision.  LE Dressing with Supervision.  Grooming while standing at sink with Supervision.  Toileting from toilet with Supervision for hygiene and clothing management.   Supine to sit with Modified [place].  Toilet transfer to toilet with Supervision.  Pt will complete functional mobility household distance with SBA using AD as needed.    Outcome: Ongoing (interventions implemented as appropriate)  Eval and POC set 2/13/19

## 2019-02-13 NOTE — ASSESSMENT & PLAN NOTE
Insulin gtt started on admission for BG >400  - Hemoglobin A1c 8.2  - Off insulin gtt x1 day  - Diabetic diet   - Well controlled on SSI

## 2019-02-13 NOTE — PROGRESS NOTES
Critical MAGNESIUM level 0.9 called by lab after re-check. Called to notify Amelia Santos PA-C. No new orders.

## 2019-02-13 NOTE — ASSESSMENT & PLAN NOTE
Likely etiology of acute encephalopathy, may be from chemotherapy vs HTN  - MRI completed  - Extubated 2/12  - Vascular Neurology following  - SBP goal 100-180  - PT/OT/SLP   - Back to baseline neuro function   - TTF under Hospital Medicine

## 2019-02-13 NOTE — ASSESSMENT & PLAN NOTE
Baseline kidney dysfunction   - Daily CMP  - IVF  - Judicious electrolyte replacement  - Monitor urine output   - Begin home oral bicarbonate

## 2019-02-13 NOTE — ASSESSMENT & PLAN NOTE
Baseline kidney dysfunction   - Daily CMP  - IVF  - Judicious electrolyte replacement  - Monitor urine output

## 2019-02-13 NOTE — PT/OT/SLP EVAL
Physical Therapy Evaluation     Patient Name: Michele Leach  MRN: [id number]   Diagnosis: Posterior reversible encephalopathy syndrome    Recommendations:   Discharge Recommendations:  (Home with HH PT)   Discharge Equipment Recommendations: none   Barriers to Discharge: none    Assessment:   Michele Leach is a 68 y.o. male admitted with a medical diagnosis of Posterior reversible encephalopathy syndrome.  Prior to admit pt was independent with all mobility and used straight cane due to R knee pain/weakness.  he now presents with the following impairments/functional limitations: weakness, impaired functional mobilty, gait instability, impaired balance, visual deficits, impaired sensation, impaired coordination.   Due to these impairments, he now requires assistance due to balance deficits placing him at an increased risk for falls.  Pt would benefit from acute skilled PT in order to address gait deficits and balance impairments to improve independence to PLOF.    Problem List:  weakness, impaired functional mobilty, gait instability, impaired balance, visual deficits, impaired sensation, impaired coordination  Rehab Prognosis:  good.  The patient would benefit from acute skilled PT services to address these deficits and maximize their functional independence.    History:     Past Medical History:   Diagnosis Date    Diabetes mellitus type 2 in obese     Encephalopathy acute 2/11/2019    HTN (hypertension), benign     Renal disease        History reviewed. No pertinent surgical history.    Subjective   Patient comments/goals: Pt states that he used the cane prior to admit due to knee pain/weakness and loss of vision in R eye.  Pain/Comfort:  ·  Pain Rating 1: 0/10  · Pain Rating Post-Intervention 1: 0/10     Recent Vital Signs: (Last documentation)  Pulse: 74 (02/13/19 1300)  BP: (!) 168/72 (02/13/19 1300)  SpO2: 97 % (02/13/19 1300)     Living Environment:  Home: The patient lives in 1 [place] home with 4 [facility] and  handrail on L. He resides with his 2 adult sons who both work full-time; 1 son works near the home.  PLOF: independent with all ADLs and ambulates using straight cane  DME owned: cane, straight, walker, rolling    Assistance Available: Upon discharge, patient will have assistance from adult sons.    Objective:   The patient had blood pressure cuff, peripheral IV, telemetry    General Precautions: fall  Recent Surgery: * No surgery found *    Recent Vital Signs:   Pulse: 74 (02/13/19 1300)  BP: (!) 168/72 (02/13/19 1300)  SpO2: 97 % (02/13/19 1300)    Physical Examination:   The patient was found sitting in chair with family member present.     Cognitive Function:  - Oriented to: person, place, time, situation  - Level of Alertness: awake, alert, pleasant  - Follows Commands/attention: Follows multistep  commands  - Communication: clear/fluent  - Safety awareness/insight to disability: intact  Musculoskeletal System  Lower Extremities:  PROM: WFL  Strength:  Muscle Group R LE L LE Comments   Hip ext 5/5 5/5    Hip flexion  4/5 4/5       Knee flexion  4/5 5/5       Knee ext. 4/5 5/5    Ankle DF 5/5 5/5    Ankle PF 5/5 5/5      Integumentary System: Visible skin intact    Neuromuscular System:  · Sensation: impaired at L2 and S2 (diminished on R); intact below knee  · Coordination:    Finger to thumb opposition: intact   Finger to nose: intact   Heel to shin: intact    Vision:  PMHx of loss of vision in R eye over last year    BALANCE:  Sitting: independent  · Time: 10 minutes    Standing: independent  · Time: 5 minutes  · Posture: rounded shoulders and anterior trunk lean    FUNCTIONAL MOBILITY ASSESSMENT:  Bed Mobility: NT due to pt positioning upon entry    Transfers:  · Sit <> stand transfer x1: min A x1 and 2 trials to complete sit to stand   · Bed <> chair transfer: NT    Gait:   Gait x 20 feet with min A for balance assist and straight cane used in RUE  · Patient demonstrated impaired balance throughout gait  "trial while using cane. He states that he feels "off-balance".  · Pt had difficulty with turns when ambulating in room due to inconsistent foot placement.  · Pt showed improper sequencing when ambulating using straight cane.  · PT assisted in balance.    Stairs: NT    Therapeutic Activities, Education, or Exercises:  The therapist educated the patient and son on the role of PT, POC, and therapy recommendations of home with continued PT as needed based on progression.  The therapist discussed the patients current mobility status, deficits, and level of assistance with ambulation and transfers.  They were educated on importance of OOB activity and participation in therapy in order to address balance deficits. Education provided about temporary use of rolling walker for stability. Time was provided for active listening, discussion of health disposition, and discussion of safe discharge recommendations. Therapist answered questions to patient/familys satisfaction within scope of practice.  Patient and family are aware of patient's deficits and therapy progression. White board updated to reflect current level of assistance.    FUNCTIONAL OUTCOME MEASURES:  [facility]  Turning over in bed (including adjusting bedclothes, sheets and blankets)?: 4  Sitting down on and standing up from a chair with arms (e.g., wheelchair, bedside commode, etc.): 3  Moving from lying on back to sitting on the side of the bed?: 3  Moving to and from a bed to a chair (including a wheelchair)?: 3  Need to walk in hospital room?: 3  Climbing 3-5 steps with a railing?: 2  Basic Mobility Total Score: 18    Goals:     Multidisciplinary Problems     Physical Therapy Goals        Problem: Physical Therapy Goal    Goal Priority Disciplines Outcome Goal Variances Interventions   Physical Therapy Goal     PT, PT/OT Ongoing (interventions implemented as appropriate)     Description:    Goals to be met by 2/20/2019    1. Pt will perform rolling to the R and L " with independently.   2. Pt will perform supine to sit from both sides of the bed with independently.  3. Pt will perform sit to supine with independently.  4. Pt will perform sit to stand transfers with independently.    5. Pt will perform bed <> chair transfers with stand step technique independently.  6. Pt will perform gait x 150 feet with SBA and least restrictive AD.  7. Pt will stand  x 15 minutes with AD and SBA without LOB while performing dynamic UE tasks to prepare for functional tasks in standing.                       Plan:   During this hospitalization, patient will be seen 3 x/week for gait training, therapeutic activities, therapeutic exercises, neuromuscular re-education to address impairments and functional mobility deficits.   · Plan of Care Expires: 03/13/19   Plan of Care Reviewed with: patient, son    This plan of care has been discussed with the patient and/or family who were involved in its development and are in agreement with the identified goals and treatment plan.     Clinical Decision Making:   Comorbidities and personal factors that affect the PT plan of care or the patient's ability to participate or progress with therapy:  · Hypertension  · DM  · Renal disease         Clinical Presentation: Stable and/or uncomplicated    Level of Complexity:     Low- [id number]     Time Tracking:   PT Received On:  02/13/19  PT Start Time:   1315    PT Stop Time:  1336  PT Total Time (min): 21 min      Billable Minutes: Evaluation 21    JEWEL He  2/13/2019

## 2019-02-14 PROBLEM — I67.83 PRES (POSTERIOR REVERSIBLE ENCEPHALOPATHY SYNDROME): Status: ACTIVE | Noted: 2019-02-14

## 2019-02-14 LAB
ALBUMIN SERPL BCP-MCNC: 2 G/DL
ALP SERPL-CCNC: 82 U/L
ALT SERPL W/O P-5'-P-CCNC: 18 U/L
ANION GAP SERPL CALC-SCNC: 11 MMOL/L
AST SERPL-CCNC: 22 U/L
BASOPHILS # BLD AUTO: 0.1 K/UL
BASOPHILS NFR BLD: 0.7 %
BILIRUB SERPL-MCNC: 0.7 MG/DL
BUN SERPL-MCNC: 56 MG/DL
CALCIUM SERPL-MCNC: 8.2 MG/DL
CHLORIDE SERPL-SCNC: 116 MMOL/L
CO2 SERPL-SCNC: 15 MMOL/L
CREAT SERPL-MCNC: 2.4 MG/DL
DIFFERENTIAL METHOD: ABNORMAL
EOSINOPHIL # BLD AUTO: 0.3 K/UL
EOSINOPHIL NFR BLD: 1.9 %
ERYTHROCYTE [DISTWIDTH] IN BLOOD BY AUTOMATED COUNT: 15.9 %
EST. GFR  (AFRICAN AMERICAN): 31.3 ML/MIN/1.73 M^2
EST. GFR  (NON AFRICAN AMERICAN): 27.1 ML/MIN/1.73 M^2
GLUCOSE SERPL-MCNC: 208 MG/DL
HCT VFR BLD AUTO: 33.5 %
HGB BLD-MCNC: 10.8 G/DL
IMM GRANULOCYTES # BLD AUTO: 0.71 K/UL
IMM GRANULOCYTES NFR BLD AUTO: 4.7 %
LYMPHOCYTES # BLD AUTO: 0.9 K/UL
LYMPHOCYTES NFR BLD: 6.1 %
MAGNESIUM SERPL-MCNC: 1.8 MG/DL
MCH RBC QN AUTO: 28.9 PG
MCHC RBC AUTO-ENTMCNC: 32.2 G/DL
MCV RBC AUTO: 90 FL
MONOCYTES # BLD AUTO: 1.5 K/UL
MONOCYTES NFR BLD: 10.3 %
NEUTROPHILS # BLD AUTO: 11.5 K/UL
NEUTROPHILS NFR BLD: 76.3 %
NRBC BLD-RTO: 0 /100 WBC
PHOSPHATE SERPL-MCNC: 3.7 MG/DL
PLATELET # BLD AUTO: 126 K/UL
PMV BLD AUTO: 12.2 FL
POCT GLUCOSE: 202 MG/DL (ref 70–110)
POCT GLUCOSE: 226 MG/DL (ref 70–110)
POCT GLUCOSE: 227 MG/DL (ref 70–110)
POTASSIUM SERPL-SCNC: 4.4 MMOL/L
PROT SERPL-MCNC: 5 G/DL
RBC # BLD AUTO: 3.74 M/UL
SODIUM SERPL-SCNC: 142 MMOL/L
WBC # BLD AUTO: 15 K/UL

## 2019-02-14 PROCEDURE — 85025 COMPLETE CBC W/AUTO DIFF WBC: CPT

## 2019-02-14 PROCEDURE — 84100 ASSAY OF PHOSPHORUS: CPT

## 2019-02-14 PROCEDURE — 94761 N-INVAS EAR/PLS OXIMETRY MLT: CPT

## 2019-02-14 PROCEDURE — 63600175 PHARM REV CODE 636 W HCPCS: Performed by: PSYCHIATRY & NEUROLOGY

## 2019-02-14 PROCEDURE — 99232 PR SUBSEQUENT HOSPITAL CARE,LEVL II: ICD-10-PCS | Mod: ,,, | Performed by: PHYSICIAN ASSISTANT

## 2019-02-14 PROCEDURE — 99232 SBSQ HOSP IP/OBS MODERATE 35: CPT | Mod: ,,, | Performed by: PHYSICIAN ASSISTANT

## 2019-02-14 PROCEDURE — 25000003 PHARM REV CODE 250: Performed by: NURSE PRACTITIONER

## 2019-02-14 PROCEDURE — A4216 STERILE WATER/SALINE, 10 ML: HCPCS | Performed by: NURSE PRACTITIONER

## 2019-02-14 PROCEDURE — 11000001 HC ACUTE MED/SURG PRIVATE ROOM

## 2019-02-14 PROCEDURE — 83735 ASSAY OF MAGNESIUM: CPT

## 2019-02-14 PROCEDURE — 25000003 PHARM REV CODE 250: Performed by: PHYSICIAN ASSISTANT

## 2019-02-14 PROCEDURE — 25000003 PHARM REV CODE 250: Performed by: PSYCHIATRY & NEUROLOGY

## 2019-02-14 PROCEDURE — 25000003 PHARM REV CODE 250: Performed by: INTERNAL MEDICINE

## 2019-02-14 PROCEDURE — 99233 PR SUBSEQUENT HOSPITAL CARE,LEVL III: ICD-10-PCS | Mod: ,,, | Performed by: PSYCHIATRY & NEUROLOGY

## 2019-02-14 PROCEDURE — 97116 GAIT TRAINING THERAPY: CPT

## 2019-02-14 PROCEDURE — 63600175 PHARM REV CODE 636 W HCPCS: Performed by: PHYSICIAN ASSISTANT

## 2019-02-14 PROCEDURE — 80053 COMPREHEN METABOLIC PANEL: CPT

## 2019-02-14 PROCEDURE — 99233 SBSQ HOSP IP/OBS HIGH 50: CPT | Mod: ,,, | Performed by: PSYCHIATRY & NEUROLOGY

## 2019-02-14 RX ORDER — SIMVASTATIN 10 MG/1
20 TABLET, FILM COATED ORAL NIGHTLY
Status: CANCELLED | OUTPATIENT
Start: 2019-02-15

## 2019-02-14 RX ORDER — CLONIDINE HYDROCHLORIDE 0.1 MG/1
0.2 TABLET ORAL ONCE
Status: COMPLETED | OUTPATIENT
Start: 2019-02-14 | End: 2019-02-14

## 2019-02-14 RX ORDER — HYDRALAZINE HYDROCHLORIDE 25 MG/1
25 TABLET, FILM COATED ORAL EVERY 12 HOURS
Status: DISCONTINUED | OUTPATIENT
Start: 2019-02-14 | End: 2019-02-14

## 2019-02-14 RX ORDER — CLONIDINE HYDROCHLORIDE 0.3 MG/1
0.3 TABLET ORAL 3 TIMES DAILY
Status: DISCONTINUED | OUTPATIENT
Start: 2019-02-14 | End: 2019-02-15 | Stop reason: HOSPADM

## 2019-02-14 RX ORDER — HYDRALAZINE HYDROCHLORIDE 25 MG/1
25 TABLET, FILM COATED ORAL EVERY 6 HOURS PRN
Status: DISCONTINUED | OUTPATIENT
Start: 2019-02-14 | End: 2019-02-15 | Stop reason: HOSPADM

## 2019-02-14 RX ORDER — NIFEDIPINE 30 MG/1
30 TABLET, EXTENDED RELEASE ORAL DAILY
Status: DISCONTINUED | OUTPATIENT
Start: 2019-02-14 | End: 2019-02-15 | Stop reason: HOSPADM

## 2019-02-14 RX ADMIN — LABETALOL HYDROCHLORIDE 10 MG: 5 INJECTION, SOLUTION INTRAVENOUS at 05:02

## 2019-02-14 RX ADMIN — Medication 3 ML: at 03:02

## 2019-02-14 RX ADMIN — CLONIDINE HYDROCHLORIDE 0.2 MG: 0.1 TABLET ORAL at 11:02

## 2019-02-14 RX ADMIN — CLONIDINE HYDROCHLORIDE 0.3 MG: 0.3 TABLET ORAL at 09:02

## 2019-02-14 RX ADMIN — CLONIDINE HYDROCHLORIDE 0.1 MG: 0.1 TABLET ORAL at 08:02

## 2019-02-14 RX ADMIN — CARVEDILOL 25 MG: 25 TABLET, FILM COATED ORAL at 09:02

## 2019-02-14 RX ADMIN — SODIUM BICARBONATE 650 MG TABLET 650 MG: at 03:02

## 2019-02-14 RX ADMIN — CLONIDINE HYDROCHLORIDE 0.3 MG: 0.3 TABLET ORAL at 03:02

## 2019-02-14 RX ADMIN — Medication 3 ML: at 09:02

## 2019-02-14 RX ADMIN — SODIUM BICARBONATE 650 MG TABLET 650 MG: at 08:02

## 2019-02-14 RX ADMIN — HYDRALAZINE HYDROCHLORIDE 10 MG: 20 INJECTION INTRAMUSCULAR; INTRAVENOUS at 06:02

## 2019-02-14 RX ADMIN — ATORVASTATIN CALCIUM 40 MG: 20 TABLET, FILM COATED ORAL at 08:02

## 2019-02-14 RX ADMIN — SODIUM BICARBONATE 650 MG TABLET 650 MG: at 09:02

## 2019-02-14 RX ADMIN — CARVEDILOL 25 MG: 25 TABLET, FILM COATED ORAL at 08:02

## 2019-02-14 RX ADMIN — HEPARIN SODIUM 5000 UNITS: 5000 INJECTION, SOLUTION INTRAVENOUS; SUBCUTANEOUS at 09:02

## 2019-02-14 RX ADMIN — HEPARIN SODIUM 5000 UNITS: 5000 INJECTION, SOLUTION INTRAVENOUS; SUBCUTANEOUS at 03:02

## 2019-02-14 RX ADMIN — CEFAZOLIN SODIUM 2 G: 2 SOLUTION INTRAVENOUS at 05:02

## 2019-02-14 RX ADMIN — CEFAZOLIN SODIUM 2 G: 2 SOLUTION INTRAVENOUS at 09:02

## 2019-02-14 RX ADMIN — ASPIRIN 325 MG ORAL TABLET 325 MG: 325 PILL ORAL at 08:02

## 2019-02-14 RX ADMIN — HEPARIN SODIUM 5000 UNITS: 5000 INJECTION, SOLUTION INTRAVENOUS; SUBCUTANEOUS at 05:02

## 2019-02-14 RX ADMIN — LABETALOL HYDROCHLORIDE 10 MG: 5 INJECTION, SOLUTION INTRAVENOUS at 10:02

## 2019-02-14 RX ADMIN — Medication 3 ML: at 05:02

## 2019-02-14 RX ADMIN — LABETALOL HYDROCHLORIDE 10 MG: 5 INJECTION, SOLUTION INTRAVENOUS at 01:02

## 2019-02-14 RX ADMIN — NIFEDIPINE 30 MG: 30 TABLET, FILM COATED, EXTENDED RELEASE ORAL at 04:02

## 2019-02-14 RX ADMIN — ALLOPURINOL 200 MG: 100 TABLET ORAL at 08:02

## 2019-02-14 RX ADMIN — STANDARDIZED SENNA CONCENTRATE AND DOCUSATE SODIUM 1 TABLET: 8.6; 5 TABLET, FILM COATED ORAL at 08:02

## 2019-02-14 RX ADMIN — INSULIN ASPART 4 UNITS: 100 INJECTION, SOLUTION INTRAVENOUS; SUBCUTANEOUS at 04:02

## 2019-02-14 RX ADMIN — INSULIN ASPART 4 UNITS: 100 INJECTION, SOLUTION INTRAVENOUS; SUBCUTANEOUS at 11:02

## 2019-02-14 RX ADMIN — INSULIN ASPART 4 UNITS: 100 INJECTION, SOLUTION INTRAVENOUS; SUBCUTANEOUS at 07:02

## 2019-02-14 NOTE — PROGRESS NOTES
"  [facility]  Adult Nutrition  Consult Note    SUMMARY     Recommendations    1. Continue current Renal diet.    - Add Novasource ONS if PO intake consistently <50%.   2. RD to monitor & follow-up.    Goals: Meet % EEN, EPN  Nutrition Goal Status: progressing towards goal  Communication of RD Recs: reviewed with RN    Reason for Assessment    Reason For Assessment: RD follow-up  Diagnosis: other (see comments)(AMS)  Relevant Medical History: HTN, DM  Interdisciplinary Rounds: did not attend    General Information Comments: Pt extubated 2/12. ST recommends regular diet w/ thin liquids. Pt reports fair appetite, consuming 50-75% of meals. Pt states he weighed 300 pounds 2-3 years ago, but has been maintaining his weight around 200# x 1 year. NFPE complete 2/11, pt w/ no physical signs of malnutrition. Will continue to monitor.  Nutrition Discharge Planning: Adequate PO intake    Nutrition/Diet History    Patient Reported Diet/Restrictions/Preferences: general  Spiritual, Cultural Beliefs, [employer] Practices, Values that Affect Care: no  Factors Affecting Nutritional Intake: decreased appetite    Anthropometrics    Temp: 97.8 °F (36.6 °C)  Height: 5' 4" (162.6 cm)  Height (inches): 64 in  Weight Method: Bed Scale  Weight: 88 kg (194 lb 0.1 oz)  Weight (lb): 194.01 lb  Ideal Body Weight (IBW), Male: 130 lb  % Ideal Body Weight, Male (lb): 149.24 lb  BMI (Calculated): 33.4  BMI Grade: 30 - 34.9- obesity - grade I    Lab/Procedures/Meds    Pertinent Labs Reviewed: reviewed  Pertinent Labs Comments: BUN 56, Creat 2.4, GFR 27.1, Gluc 208  Pertinent Medications Reviewed: reviewed  Pertinent Medications Comments: Statin    Estimated/Assessed Needs    Weight Used For Calorie Calculations: 91.1 kg (200 lb 13.4 oz)(Dosing wt)     Energy Calorie Requirements (kcal): 2003 kcal/d  Energy Need Method: Carmelo Merchant(1.25 PAL)     Protein Requirements:  g/d (1-1.2 g/kg)  Weight Used For Protein " Calculations: 91.1 kg (200 lb 13.4 oz)     Estimated Fluid Requirement Method: other (see comments)(Per MD or 1 mL/kcal)     CHO Requirement: 50% total kcals    Nutrition Prescription Ordered    Current Diet Order: Renal    Evaluation of Received Nutrient/Fluid Intake    Comments: LBM: 2/10    Tolerance: tolerating    Nutrition Risk    Level of Risk/Frequency of Follow-up: (2x/week)     Assessment and Plan    Nutrition Problem  Inadequate energy intake     Related to (etiology):   Inability to consume sufficient energy     Signs and Symptoms (as evidenced by):   NPO with no alternate means of nutrition      Nutrition Diagnosis Status:   Resolved      Monitor and Evaluation    Food and Nutrient Intake: energy intake, food and beverage intake  Food and Nutrient Adminstration: diet order  Physical Activity and Function: nutrition-related ADLs and IADLs  Anthropometric Measurements: weight, weight change  Biochemical Data, Medical Tests and Procedures: inflammatory profile, lipid profile, glucose/endocrine profile, gastrointestinal profile, electrolyte and renal panel  Nutrition-Focused Physical Findings: overall appearance     Nutrition Follow-Up    RD Follow-up?: Yes

## 2019-02-14 NOTE — ASSESSMENT & PLAN NOTE
Insulin gtt started on admission for BG >400  - Hemoglobin A1c 8.2  - Off insulin gtt x2 days  - Diabetic diet   - Well controlled on SSI

## 2019-02-14 NOTE — NURSING TRANSFER
Nursing Transfer Note      2/14/2019     Transfer from 6094 to 1123b  Transfer via wheelchair    Transfer with son at side    Transported by KAMRYN Barillas RN    Medicines sent: Yes    Chart send with patient: yes    Notified: Nurse Jelani     Patient reassessed at: 2/14/19 @1345    Upon arrival to floor: Turned in chart to

## 2019-02-14 NOTE — ASSESSMENT & PLAN NOTE
Baseline kidney dysfunction   - Daily CMP  - IVF discontinued as now on PO diet   - Judicious electrolyte replacement  - Monitor urine output   - Home oral bicarbonate

## 2019-02-14 NOTE — PROGRESS NOTES
[facility]  Neurocritical Care  Progress Note    Admit Date: 2/11/2019  Service Date: 02/14/2019  Length of Stay: 3    Subjective:     Chief Complaint: Posterior reversible encephalopathy syndrome    History of Present Illness: Mr Leach is a 69 yo male with a PMH of Liver disease, DM, CAD, HTN, R eye blindness, and facto VIII defecit who presents to [facility] for AMS. He was at home yesterday when he stood up to go to the bathroom and on the way vomited and became confused. EMS arrived to find the pt confused with SBP >200. He had a R gaze and L sided weakness. . CTA at OSH showed no flow limiting stenosis or LVO. He was unresponsive upon arrival to OSH and was intubated in ED. He is being admitted to [facility] for a higher level of care.     Hospital Course: 2/11: Admit [facility]   2/12: extubation, off insulin gtt  2/13: Tolerating RA, narrow ABX, TTF under HM   2/14: HTN overnight, oral meds adjusted. TTF under HM     Interval History: Patient hypertensive into 200s SBP overnight requiring multiple IV pushes of antihypertensives. Transfer to floor held. Home regimen clarifies with patient and medications adequately adjusted. Well controlled this AM. TTF under Hospital Medicine     Review of Systems: Review of Systems   Constitutional: Negative for chills and fever.   Respiratory: Negative for cough.    Gastrointestinal: Negative for diarrhea, nausea and vomiting.   Neurological: Negative for headaches.         Vitals:   Temp: 97.8 °F (36.6 °C)  Pulse: 65  Rhythm: normal sinus rhythm  BP: (!) 144/62  MAP (mmHg): 89  Resp: 19  SpO2: 96 %  O2 Device (Oxygen Therapy): room air    Temp  Min: 97.6 °F (36.4 °C)  Max: 97.9 °F (36.6 °C)  Pulse  Min: 61  Max: 99  BP  Min: 144/62  Max: 229/93  MAP (mmHg)  Min: 89  Max: 133  Resp  Min: 15  Max: 37  SpO2  Min: 92 %  Max: 100 %    02/13 0701 - 02/14 0700  In: 1531.7 [P.O.:840; I.V.:491.7]  Out: 1565 [Urine:1565]   Unmeasured Output  Stool Occurrence: 1     Examination:    Constitutional: Well-nourished and -developed. No apparent distress.   Eyes: Conjunctiva clear, anicteric. Lids no lesions.  Head/Ears/Nose/Mouth/Throat/Neck: Moist mucous membranes. External ears, nose atraumatic.   Cardiovascular: Regular rhythm.  Respiratory: Comfortable respirations.    Neurologic:  -GCS [id number]  -Alert. Oriented to person, place, and time. Speech fluent. Follows commands.  -Cranial nerves intact,  -Motor without focal deficits   Medications:   Continuous Scheduled  allopurinol 200 mg Daily   aspirin 325 mg Daily   atorvastatin 40 mg Daily   carvedilol 25 mg BID   ceFAZolin (ANCEF) IVPB 2 g Q12H   cloNIDine 0.3 mg TID   heparin (porcine) 5,000 Units Q8H   senna-docusate 8.6-50 mg 1 tablet Daily   sodium bicarbonate 650 mg TID   sodium chloride 0.9% 3 mL Q8H   PRN  acetaminophen 650 mg Q6H PRN   dextrose 50% 12.5 g PRN   dextrose 50% 25 g PRN   glucagon (human recombinant) 1 mg PRN   glucose 16 g PRN   glucose 24 g PRN   hydrALAZINE 10 mg Q8H PRN   insulin aspart U-100 1-10 Units QID (AC + HS) PRN   labetalol 10 mg Q4H PRN   ondansetron 4 mg Q8H PRN      Today I independently reviewed pertinent medications, lines/drains/airways, laboratory results, notably:     ISTAT: No results for input(s): PH, PCO2, PO2, POCSATURATED, HCO3, BE, POCNA, POCK, POCTCO2, POCGLU, POCICA, POCLAC, SAMPLE in the last 24 hours.   Chem: Recent Labs   Lab 02/14/19  0432      K 4.4   *   CO2 15*   *   BUN 56*   CREATININE 2.4*   ESTGFRAFRICA 31.3*   EGFRNONAA 27.1*   CALCIUM 8.2*   MG 1.8   PHOS 3.7   ANIONGAP 11   PROT 5.0*   ALBUMIN 2.0*   BILITOT 0.7   ALKPHOS 82   AST 22   ALT 18     Heme: Recent Labs   Lab 02/14/19  0432   WBC 15.00*   HGB 10.8*   HCT 33.5*   *     Endo:   Recent Labs   Lab 02/13/19  2253 02/14/19  0746 02/14/19  1128   POCTGLUCOSE 192* 226* 202*        Assessment/Plan:     Neuro   * Posterior reversible encephalopathy syndrome    Likely etiology of acute encephalopathy, may  be from chemotherapy vs HTN  - MRI completed  - Extubated 2/12  - Vascular Neurology following  - SBP goal 100-180  - PT/OT/SLP   - Back to baseline neuro function   - TTF under Hospital Medicine      Pulmonary   Aspiration into respiratory tract    - Report of aspiration prior to intubation   - Respiratory culture growing Ecoli  - Zosyn narrowed to Ancef based on sensitivities   - Tolerating extubation well, on RA   - Leukocytosis improving      Cardiac/Vascular   Essential hypertension    SBP goal 100-180   - Carvedilol   - Home clonidine restarted   - Echo and EKG      Renal/   Stage 4 chronic kidney disease    Baseline kidney dysfunction   - Daily CMP  - IVF discontinued as now on PO diet   - Judicious electrolyte replacement  - Monitor urine output   - Home oral bicarbonate      Endocrine   Type 2 diabetes mellitus with hyperglycemia, without long-term current use of insulin    Insulin gtt started on admission for BG >400  - Hemoglobin A1c 8.2  - Off insulin gtt x2 days  - Diabetic diet   - Well controlled on SSI            The patient is being Prophylaxed for:  Venous Thromboembolism with: Mechanical or Chemical  Stress Ulcer with: Not Applicable   Ventilator Pneumonia with: not applicable    Activity Orders          None        Full Code    Amelia Santos PA-C  Neurocritical Care  [facility]

## 2019-02-14 NOTE — PLAN OF CARE
Problem: Adult Inpatient Plan of Care  Goal: Plan of Care Review  Outcome: Ongoing (interventions implemented as appropriate)  Hypertension persistent through the night. Scheduled and PRN medications administered. BP remains higher than goal of 180. Last PRN 0530. POC to stepdown once BP more controlled. POC discussed with pt in full detail, all questions and concerns addressed. WCTM.

## 2019-02-14 NOTE — SUBJECTIVE & OBJECTIVE
Neurologic Chief Complaint: Right sided weakness, resolved    Subjective:     Interval History: Patient is seen for follow-up neurological assessment and treatment recommendations:     Patient doing well on exam today. Sitting in chair. Primary team continuing to adjust BP regimen. Patient to step down to hospital medicine today.     HPI, Past Medical, Family, and Social History remains the same as documented in the initial encounter.     Review of Systems   Constitutional: Negative for chills and fever.   Respiratory: Negative for cough and shortness of breath.    Cardiovascular: Negative for chest pain.   Gastrointestinal: Negative for abdominal pain, diarrhea, nausea and vomiting.   Neurological: Negative for speech difficulty, weakness and headaches.   Psychiatric/Behavioral: Negative for agitation and confusion.     Scheduled Meds:   allopurinol  200 mg Oral Daily    aspirin  325 mg Per NG tube Daily    atorvastatin  40 mg Oral Daily    carvedilol  25 mg Oral BID    ceFAZolin (ANCEF) IVPB  2 g Intravenous Q12H    cloNIDine  0.3 mg Oral TID    heparin (porcine)  5,000 Units Subcutaneous Q8H    senna-docusate 8.6-50 mg  1 tablet Per NG tube Daily    sodium bicarbonate  650 mg Oral TID    sodium chloride 0.9%  3 mL Intravenous Q8H     Continuous Infusions:    PRN Meds:acetaminophen, dextrose 50%, dextrose 50%, glucagon (human recombinant), glucose, glucose, hydrALAZINE, insulin aspart U-100, labetalol, ondansetron    Objective:     Vital Signs (Most Recent):  Temp: 97.8 °F (36.6 °C) (02/14/19 0300)  Pulse: 71 (02/14/19 1130)  Resp: (!) 25 (02/14/19 1130)  BP: (!) 170/67 (02/14/19 1130)  SpO2: 97 % (02/14/19 1130)  BP Location: Right arm    Vital Signs Range (Last 24H):  Temp:  [97.6 °F (36.4 °C)-97.9 °F (36.6 °C)]   Pulse:  [63-99]   Resp:  [15-37]   BP: (151-229)/(64-93)   SpO2:  [92 %-100 %]   BP Location: Right arm    Physical Exam   Constitutional: He is oriented to person, place, and time. He  appears well-developed and well-nourished. No distress.   HENT:   Head: Normocephalic and atraumatic.   Eyes: EOM are normal. Pupils are equal, round, and reactive to light. No scleral icterus.   Neck: Normal range of motion. Neck supple. No JVD present.   Cardiovascular: Normal rate, regular rhythm, normal heart sounds and intact distal pulses.   No murmur heard.  Pulmonary/Chest: Effort normal and breath sounds normal. No respiratory distress. He has no wheezes.   Abdominal: Soft. There is no tenderness.   Musculoskeletal: Normal range of motion.   Neurological: He is alert and oriented to person, place, and time. No cranial nerve deficit.   Skin: Skin is warm. Capillary refill takes less than 2 seconds. He is not diaphoretic. No erythema.   Psychiatric: He has a normal mood and affect.   Vitals reviewed.      Neurological Exam:   LOC: alert  Attention Span: Good   Language: No aphasia  Articulation: No dysarthria  Orientation: Person, Place, Time   Visual Fields: Full in left eye. 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  EOM (CN III, IV, VI): Full/intact  Pupils (CN II, III): PERRL  Anisocoria Side: R pupil - mm Reactive: No   Facial Sensation (CN V): Normal  Facial Movement (CN VII): Symmetric facial expression   All extremities: 5/5      Laboratory:  CMP:   Recent Labs   Lab 02/14/19  0432   CALCIUM 8.2*   ALBUMIN 2.0*   PROT 5.0*      K 4.4   CO2 15*   *   BUN 56*   CREATININE 2.4*   ALKPHOS 82   ALT 18   AST 22   BILITOT 0.7     CBC:   Recent Labs   Lab 02/14/19  0432   WBC 15.00*   RBC 3.74*   HGB 10.8*   HCT 33.5*   *   MCV 90   MCH 28.9   MCHC 32.2     Lipid Panel:   Recent Labs   Lab 02/11/19  0655   CHOL 105*   LDLCALC 49.0*   HDL 29*   TRIG 135     Hgb A1C:   Recent Labs   Lab 02/11/19  0655   HGBA1C 8.2*       Diagnostic Results   MRI brain 2/11  1. Patchy cortical and subcortical T2/FLAIR hyperintensity with a posterior predominant distribution.  Findings highly suggestive of  posterior reversible encephalopathy syndrome (PRES).  Other encephalitides thought unlikely, but clinical correlation is required.  Repeat MRI for confirmatory purposes if/when clinically appropriate.  2. No associated hemorrhage or infarct at this time.  3. Mild chronic microvascular ischemic disease  This report was flagged in Epic as abnormal.

## 2019-02-14 NOTE — ASSESSMENT & PLAN NOTE
69 y/o male with AMS and hypertensive urgency with LSW, AMS and right gaze upon initial presentation     Antithrombotics:  mg daily    Statin: Lipitor 40 mg daily    Therapy: PT/OT/ST    Diagnostics: NA    Risk factor Modifications: HTN, DM, renal disease      MRI showing findings consistent with PRES  Concomitant prerenal CLAUDIO and HTN at admission with SBP >200    Primary team adjusting BP medication regimen. Patient to step down to hospital medicine today.      Rec:  -Continue tight BP control with SBP <160  - mg QD  -Lipitor 40 mg QD   -Neuro checks q4  -will continue to follow patient

## 2019-02-14 NOTE — PROGRESS NOTES
[facility]  Vascular Neurology  Comprehensive Stroke Center  Progress Note    Assessment/Plan:     * Posterior reversible encephalopathy syndrome    69 y/o male with AMS and hypertensive urgency with LSW, AMS and right gaze upon initial presentation     Antithrombotics:  mg daily    Statin: Lipitor 40 mg daily    Therapy: PT/OT/ST    Diagnostics: NA    Risk factor Modifications: HTN, DM, renal disease    MRI showing findings consistent with PRES  Concomitant prerenal CLAUDIO and HTN at admission with SBP >200    Primary team adjusting BP medication regimen. Patient to step down to hospital medicine today. At this time we will sign off. Please call with any questions or concerns.     Rec:  -Continue tight BP control with SBP <160  - mg QD  -Lipitor 40 mg QD   -Neuro checks q4     Essential hypertension    Stroke risk factor  SBP <160    -Avoid overcorrecting hypertension  - Primary tem uptitrating BP medications. Patient currently on Clonidine 0.3 TID, Coreg 25 BID  - required 1 PRN IV hydralazine this AM     Type 2 diabetes mellitus with hyperglycemia, without long-term current use of insulin    Stroke risk factor  Hgb A1C 8.2  SSI  Management per primary      Encephalopathy acute    See PRES            02/12/2019: WENDYEON, patient's BP in more acceptable range. Patient remains intubated and sedated on Precedex  02/13/2019: Patient extubated by primary service, physical exam returned back to baseline. Patient continues to be covered on ABx for suspected aspiration PNA  2/14 - Patient doing well on exam today. Sitting in chair. Primary team continuing to adjust BP regimen. Patient to step down to hospital medicine today.     STROKE DOCUMENTATION        NIH Scale:  1a. Level of Consciousness: 0-->Alert, keenly responsive  1b. LOC Questions: 0-->Answers both questions correctly  1c. LOC Commands: 0-->Performs both tasks correctly  2. Best Gaze: 0-->Normal  3. Visual: 0-->No visual loss  4.  Facial Palsy: 0-->Normal symmetrical movements  5a. Motor Arm, Left: 0-->No drift, limb holds 90 (or 45) degrees for full 10 secs  5b. Motor Arm, Right: 0-->No drift, limb holds 90 (or 45) degrees for full 10 secs  6a. Motor Leg, Left: 0-->No drift, leg holds 30 degree position for full 5 secs  6b. Motor Leg, Right: 0-->No drift, leg holds 30 degree position for full 5 secs  7. Limb Ataxia: 0-->Absent  8. Sensory: 0-->Normal, no sensory loss  9. Best Language: 0-->No aphasia, normal  10. Dysarthria: 0-->Normal  11. Extinction and Inattention (formerly Neglect): 0-->No abnormality  Total (NIH Stroke Scale): 0       Modified Cobb Score: 0  [place] Coma Scale:    ABCD2 Score:    [id number] Score:   HAS -BLED Score:   ICH Score:   Hunt & Crowder Classification:      Hemorrhagic change of an Ischemic Stroke: Does this patient have an ischemic stroke with hemorrhagic changes? No     Neurologic Chief Complaint: Right sided weakness, resolved    Subjective:     Interval History: Patient is seen for follow-up neurological assessment and treatment recommendations:     Patient doing well on exam today. Sitting in chair. Primary team continuing to adjust BP regimen. Patient to step down to hospital medicine today.     HPI, Past Medical, Family, and Social History remains the same as documented in the initial encounter.     Review of Systems   Constitutional: Negative for chills and fever.   Respiratory: Negative for cough and shortness of breath.    Cardiovascular: Negative for chest pain.   Gastrointestinal: Negative for abdominal pain, diarrhea, nausea and vomiting.   Neurological: Negative for speech difficulty, weakness and headaches.   Psychiatric/Behavioral: Negative for agitation and confusion.     Scheduled Meds:   allopurinol  200 mg Oral Daily    aspirin  325 mg Per NG tube Daily    atorvastatin  40 mg Oral Daily    carvedilol  25 mg Oral BID    ceFAZolin (ANCEF) IVPB  2 g Intravenous Q12H    cloNIDine  0.3 mg  Oral TID    heparin (porcine)  5,000 Units Subcutaneous Q8H    senna-docusate 8.6-50 mg  1 tablet Per NG tube Daily    sodium bicarbonate  650 mg Oral TID    sodium chloride 0.9%  3 mL Intravenous Q8H     Continuous Infusions:    PRN Meds:acetaminophen, dextrose 50%, dextrose 50%, glucagon (human recombinant), glucose, glucose, hydrALAZINE, insulin aspart U-100, labetalol, ondansetron    Objective:     Vital Signs (Most Recent):  Temp: 97.8 °F (36.6 °C) (02/14/19 0300)  Pulse: 71 (02/14/19 1130)  Resp: (!) 25 (02/14/19 1130)  BP: (!) 170/67 (02/14/19 1130)  SpO2: 97 % (02/14/19 1130)  BP Location: Right arm    Vital Signs Range (Last 24H):  Temp:  [97.6 °F (36.4 °C)-97.9 °F (36.6 °C)]   Pulse:  [63-99]   Resp:  [15-37]   BP: (151-229)/(64-93)   SpO2:  [92 %-100 %]   BP Location: Right arm    Physical Exam   Constitutional: He is oriented to person, place, and time. He appears well-developed and well-nourished. No distress.   HENT:   Head: Normocephalic and atraumatic.   Eyes: EOM are normal. Pupils are equal, round, and reactive to light. No scleral icterus.   Neck: Normal range of motion. Neck supple. No JVD present.   Cardiovascular: Normal rate, regular rhythm, normal heart sounds and intact distal pulses.   No murmur heard.  Pulmonary/Chest: Effort normal and breath sounds normal. No respiratory distress. He has no wheezes.   Abdominal: Soft. There is no tenderness.   Musculoskeletal: Normal range of motion.   Neurological: He is alert and oriented to person, place, and time. No cranial nerve deficit.   Skin: Skin is warm. Capillary refill takes less than 2 seconds. He is not diaphoretic. No erythema.   Psychiatric: He has a normal mood and affect.   Vitals reviewed.      Neurological Exam:   LOC: alert  Attention Span: Good   Language: No aphasia  Articulation: No dysarthria  Orientation: Person, Place, Time   Visual Fields: Full in left eye. 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  EOM (CN III, IV,  VI): Full/intact  Pupils (CN II, III): PERRL  Anisocoria Side: R pupil - mm Reactive: No   Facial Sensation (CN V): Normal  Facial Movement (CN VII): Symmetric facial expression   All extremities: 5/5      Laboratory:  CMP:   Recent Labs   Lab 02/14/19  0432   CALCIUM 8.2*   ALBUMIN 2.0*   PROT 5.0*      K 4.4   CO2 15*   *   BUN 56*   CREATININE 2.4*   ALKPHOS 82   ALT 18   AST 22   BILITOT 0.7     CBC:   Recent Labs   Lab 02/14/19  0432   WBC 15.00*   RBC 3.74*   HGB 10.8*   HCT 33.5*   *   MCV 90   MCH 28.9   MCHC 32.2     Lipid Panel:   Recent Labs   Lab 02/11/19  0655   CHOL 105*   LDLCALC 49.0*   HDL 29*   TRIG 135     Hgb A1C:   Recent Labs   Lab 02/11/19  0655   HGBA1C 8.2*       Diagnostic Results   MRI brain 2/11  1. Patchy cortical and subcortical T2/FLAIR hyperintensity with a posterior predominant distribution.  Findings highly suggestive of posterior reversible encephalopathy syndrome (PRES).  Other encephalitides thought unlikely, but clinical correlation is required.  Repeat MRI for confirmatory purposes if/when clinically appropriate.  2. No associated hemorrhage or infarct at this time.  3. Mild chronic microvascular ischemic disease  This report was flagged in Epic as abnormal.        Mary Goins NP  [facility]  Department of Vascular Neurology   [facility]

## 2019-02-14 NOTE — PLAN OF CARE
02/14/19 1612   Post-Acute Status   Post-Acute Authorization Home Health/Hospice     SW met with Pt and Pt son at bedside. Discussed therapy recs for HH and provided list. Family to review and give choices asap.    Matilda Villa LMSW  Neurocritical Care   [facility]  [id number]

## 2019-02-14 NOTE — PLAN OF CARE
02/14/19 0939   Discharge Reassessment   Assessment Type Discharge Planning Reassessment   Provided patient/caregiver education on the expected discharge date and the discharge plan Yes   Do you have any problems affording any of your prescribed medications? No   Discharge Plan A Home Health;Home with family   Discharge Plan B Home with family   DME Needed Upon Discharge  none   Patient choice form signed by patient/caregiver N/A   Anticipated Discharge Disposition Home-Health   Can the patient answer the patient profile reliably? Yes, cognitively intact   How does the patient rate their overall health at the present time? Fair   Describe the patient's ability to walk at the present time. No restrictions   How often would a person be available to care for the patient? Whenever needed   Number of comorbid conditions (as recorded on the chart) Five or more   Post-Acute Status   Post-Acute Authorization Home Health/Hospice   Home Health/Hospice Status Discharge Plan Changed       Per MD, patient to step down to floor today.  SW will provide HH list for choices.     Marilu Leach RN, CCRN-K, [facility]  Neuro-Critical Care   X [phone]

## 2019-02-14 NOTE — ASSESSMENT & PLAN NOTE
Stroke risk factor  SBP <160    -Avoid overcorrecting hypertension  - Primary tem uptitrating BP medications. Patient currently on Clonidine 0.3 TID, Coreg 25 BID  - required 1 PRN IV hydralazine this AM

## 2019-02-14 NOTE — PLAN OF CARE
Problem: Physical Therapy Goal  Goal: Physical Therapy Goal    Goals to be met by 2/20/2019    1. Pt will perform rolling to the R and L with independently.  2. Pt will perform supine to sit from both sides of the bed with independently.  3. Pt will perform sit to supine with independently.  4. Pt will perform sit to stand transfers with independently.    5. Pt will perform bed <> chair transfers with stand step technique independently.  6. Pt will perform gait x 150 feet with SBA and least restrictive AD.  7. Pt will stand  x 15 minutes with AD and SBA without LOB while performing dynamic UE tasks to prepare for functional tasks in standing.      Outcome: Ongoing (interventions implemented as appropriate)  Patient participated well in therapy.  POC and goals remain appropriate.  Please refer to the progress note for functional mobility.     Pt is safe to perform transfers and ambulation with RW and nursing supervision.     Justa James, SPT  2/14/2019

## 2019-02-14 NOTE — MEDICAL/APP STUDENT
Hospital Medicine  Progress note    Team: Hospital medicine team D, Daisy Sky MD  Admit Date: 2/11/2019  TAJ   Code status: Full Code  Length of Stay: 3 day(s)    Principal Problem:  Posterior reversible encephalopathy syndrome    Interval hx:      ROS   Respiratory: no cough or shortness of breath  Cardiovascular: no chest pain or palpitations  Gastrointestinal: no nausea or vomiting, no abdominal pain or change in bowel habits  Behavioral/Psych: no depression or anxiety      PEx  Temp:  [97.6 °F (36.4 °C)-98.1 °F (36.7 °C)]   Pulse:  [74-99]   Resp:  [15-35]   BP: (151-229)/(58-93)   SpO2:  [92 %-99 %]     Intake/Output Summary (Last 24 hours) at 2/14/2019 0832  Last data filed at 2/14/2019 0528  Gross per 24 hour   Intake 1431.66 ml   Output 1485 ml   Net -53.34 ml       General Appearance: no acute distress   Heart: regular rate and rhythm  Respiratory: Normal respiratory effort, no crackles   Abdomen: Soft, non-tender; bowel sounds active  Skin: intact. IV sites ok  Neurologic:  No focal numbness or weakness  Mental status: Alert, oriented x 4, affect appropriate     Recent Labs   Lab 02/12/19 0343 02/13/19 0611 02/14/19  0432   WBC 23.89* 18.45* 15.00*   HGB 11.9* 10.4* 10.8*   HCT 38.5* 34.1* 33.5*   * 125* 126*     Recent Labs   Lab 02/12/19 0343 02/13/19  0008 02/13/19 0611 02/14/19  0432     --   --  143 142   K 4.6  --   --  4.0 4.4   *  --   --  119* 116*   CO2 16*  --   --  15* 15*   BUN 50*  --   --  56* 56*   CREATININE 3.0*  --   --  2.6* 2.4*   GLU 87  --   --  79 208*   CALCIUM 7.9*  --   --  7.1* 8.2*   MG 1.2*   < > 1.4* 2.3 1.8   PHOS 4.8*  --   --  3.5 3.7    < > = values in this interval not displayed.     Recent Labs   Lab 02/12/19  0343 02/13/19  0611 02/14/19  0432   ALKPHOS 99 84 82   ALT 19 16 18   AST 23 24 22   ALBUMIN 2.3* 1.9* 2.0*   PROT 5.4* 4.7* 5.0*   BILITOT 1.4* 0.8 0.7      Recent Labs   Lab 02/12/19  2208 02/13/19  0807 02/13/19  1121 02/13/19  1629  02/13/19  2253 02/14/19  0746   POCTGLUCOSE 103 78 279* 261* 192* 226*     No results for input(s): CPK, CPKMB, MB, TROPONINI in the last 72 hours.    Scheduled Meds:   allopurinol  200 mg Oral Daily    aspirin  325 mg Per NG tube Daily    atorvastatin  40 mg Oral Daily    carvedilol  25 mg Oral BID    ceFAZolin (ANCEF) IVPB  2 g Intravenous Q12H    cloNIDine  0.1 mg Oral BID    heparin (porcine)  5,000 Units Subcutaneous Q8H    senna-docusate 8.6-50 mg  1 tablet Per NG tube Daily    sodium bicarbonate  650 mg Oral TID    sodium chloride 0.9%  3 mL Intravenous Q8H     Continuous Infusions:  As Needed:  acetaminophen, dextrose 50%, dextrose 50%, glucagon (human recombinant), glucose, glucose, hydrALAZINE, insulin aspart U-100, labetalol, ondansetron    Active Hospital Problems    Diagnosis  POA    *Posterior reversible encephalopathy syndrome [I67.83]  Yes    Stage 4 chronic kidney disease [N18.4]  Yes    Encephalopathy acute [G93.40]  Yes    Type 2 diabetes mellitus with hyperglycemia, without long-term current use of insulin [E11.65]  Yes    Essential hypertension [I10]  Yes    Liver failure [K72.90]  Yes    Aspiration into respiratory tract [T17.908A]  Yes    Vasogenic brain edema [G93.6]  Yes      Resolved Hospital Problems    Diagnosis Date Resolved POA    Acute respiratory failure with hypoxia [J96.01] 02/12/2019 Yes       Overview  68 y.o. male with a history of renal disease, DM2, CAD, HTN, right eye blindness, and factor VIII deficiency who presents with altered mental status. At home, pt vomited and became confused. EMS noted SBP > 200. Presented with right sided gaze and left sided weakness. Admitted to the [facility]. BP controlled with nicardipine gtt, then switched to PO coreg. Received empiric zosyn and vancomycin for suspected aspiration in the field, later changed to cephazolin. Vascular neurology initially suspected right MCA stroke, but MRI findings later suggested PRES. Vascular  neurology recommended no procedural intervention. Pt was intubated in the ED and was stable overnight on precedex, and so was extubated after 1 day. SLP determined no speech needs.    Assessment and Plan for Problems addressed today:    Posterior reversible encephalopathy syndrome  · ddx recent chemotherapy for factor VIII deficiency vs hypertension  · Per NCC, BP goal < 180  · Continue atorvastatin, ASA, and heparin  · Vascular neurology following     Aspiration pneumonia  · Reported aspiration during acute decompensation in the field, CXR shows no consolidation  · Respiratory culture shows E. coli, treating with cephazolin     Hypertension  · Per NCC, BP goal < 180  · Coreg, clonidine     Acute on chronic kidney disease  · prerenal CLAUDIO on baseline dysfunction  · Gentle hydration  · Trending down, monitor Cr  · Continue home bicarbonate     Diabetes mellitus type 2  · HgbA1C 8.2 (2/11)  · POCT, LDSSI     Factor VIII deficiency  · Followed by heme/onc in [place], Dr. Montoya   · No bleeding concerns during ICU stay     Diet:  Diet renal  GI PPx: none  DVT PPx:   VTE Risk Mitigation (From admission, onward)        Ordered     heparin (porcine) injection 5,000 Units  Every 8 hours      02/13/19 1106     Reason for No Pharmacological VTE Prophylaxis  Once      02/11/19 0550     IP VTE HIGH RISK PATIENT  Once      02/11/19 0550     Place sequential compression device  Until discontinued      02/11/19 0550        Goals of Care:     Lines/ Drains/ Airways:    Wounds:    Discharge plan and follow up    Provider Daisy Sky MD    I personally scribed for Daisy Sky on 02/14/2019 at 8:32 AM. Electronically signed by gio Rojas on 02/14/2019 at 8:32 AM

## 2019-02-14 NOTE — PT/OT/SLP PROGRESS
"Physical Therapy Treatment    Patient Name: Michele Leach  MRN: [id number]   Diagnosis: Posterior reversible encephalopathy syndrome    Recommendations:     Discharge Recommendations:  (Home with HH)   Discharge Equipment Recommendations: none   Barriers to Discharge: none    Assessment:   Michele Leach is a 68 y.o. male admitted with a medical diagnosis of Posterior reversible encephalopathy syndrome.  Pt presents with the following functional limitations/impairments: weakness, impaired endurance, impaired balance, impaired self care skills, impaired functional mobilty, impaired cardiopulmonary response to activity. Pt showed improved gait stability using RW with decreased lateral weight shift to R and impaired foot clearance bilaterally. He continues to demonstrate difficulty navigating the environment due to vision loss and requires BUE support for balance assistance. Mr. Leach would continue to benefit from acute skilled PT in order to improve functional independence.      Rehab Prognosis:  good; patient would benefit from acute skilled PT services to address these deficits and reach maximum level of function.      Subjective   PT communicated with nursing prior to therapy.     Patient comments/goals: "I've already had breakfast. I am ready to roll."  Pain/Comfort:  · Pain Rating 1: 0/10  · Pain Rating Post-Intervention 1: 0/10    Recent Vital Signs: (Last documentation)  Pulse: 84 (02/14/19 0845)  BP: (!) 181/77 (02/14/19 0845)  SpO2: 97 % (02/14/19 0845)     Objective:   General Precautions: fall, vision impaired  Recent Surgery: * No surgery found *    The patient currently has blood pressure cuff, pulse ox (continuous), telemetry, peripheral IV.    The patient was found supine in bed.    Functional Mobility:  Bed Mobility:   · Rolling Right: mod I  · Use of bed rails LUE  · Rolling Left: mod I  · Use of bed rails with RUE  · Supine to Sit: mod I     Sitting Balance at Edge of Bed:  · Assistance Level " Required: independent  · Time: 4 minutes      Transfers:   · Sit <> Stand Transfer x1: SBA  · Pt required VCs in order to perform transfer with appropriate technique.  · Pt demonstrated understanding and good carryover of instructions.     Gait:  Gait x 114 feet with RW and SBA x2  · Pt demonstrated decreased lateral weight shift over RLE and decreased foot clearance bilaterally. Pt notes that he has difficulty scanning the environment when ambulating due to vision loss.  · PT encouraged pt to visually assess environment to R side by navigating obstacles and reading signs on R side of hallway in order to promote active head turning with ambulation.    Cardiovascular Response to Activity  · Supine:  mmHg  · After gait trial:  mmHg    Therapeutic Activities, Education, or Exercises:  Time was provided for active listening, discussion of health disposition, and discussion of safe discharge recommendations. Therapist answered questions to patient/familys satisfaction within scope of practice.  Patient and family are aware of patient's deficits and therapy progression. White board updated to reflect current level of assistance.    The patient was left sitting up in chair with all lines intact and son at bedside.     FUNCTIONAL OUTCOME MEASURES:  Turning over in bed (including adjusting bedclothes, sheets and blankets)?: 4  Sitting down on and standing up from a chair with arms (e.g., wheelchair, bedside commode, etc.): 4  Moving from lying on back to sitting on the side of the bed?: 4  Moving to and from a bed to a chair (including a wheelchair)?: 4  Need to walk in hospital room?: 3  Climbing 3-5 steps with a railing?: 3  Basic Mobility Total Score: 22    Goals:     Multidisciplinary Problems     Physical Therapy Goals        Problem: Physical Therapy Goal    Goal Priority Disciplines Outcome Goal Variances Interventions   Physical Therapy Goal     PT, PT/OT Ongoing (interventions implemented as  appropriate)     Description:    Goals to be met by 2/20/2019    1. Pt will perform rolling to the R and L with independently.  2. Pt will perform supine to sit from both sides of the bed with independently.  3. Pt will perform sit to supine with independently.  4. Pt will perform sit to stand transfers with independently.    5. Pt will perform bed <> chair transfers with stand step technique independently.  6. Pt will perform gait x 150 feet with SBA and least restrictive AD.  7. Pt will stand  x 15 minutes with AD and SBA without LOB while performing dynamic UE tasks to prepare for functional tasks in standing.                        Plan:   During this hospitalization, patient to be seen 3 x/week to address their physical therapy related impairments and improve their overall level of function.   · Plan of Care Expires: 03/13/19   Plan of Care Reviewed with: patient, son    This plan of care has been discussed with the patient and/or family who were involved in its development and are in agreement with the identified goals and treatment plan.     Time Tracking:     PT Received On:  02/14/19  PT Start Time:   0824    PT Stop Time:  0847  PT Total Time (min): 23 min     Billable Minutes: Gait Training 23     Justa James, SPT  2/14/2019

## 2019-02-15 ENCOUNTER — NURSE TRIAGE (OUTPATIENT)
Dept: ADMINISTRATIVE | Facility: CLINIC | Age: 69
End: 2019-02-15

## 2019-02-15 VITALS
OXYGEN SATURATION: 60 % | TEMPERATURE: 98 F | RESPIRATION RATE: 16 BRPM | DIASTOLIC BLOOD PRESSURE: 70 MMHG | SYSTOLIC BLOOD PRESSURE: 150 MMHG | WEIGHT: 194.44 LBS | HEIGHT: 64 IN | BODY MASS INDEX: 33.2 KG/M2 | HEART RATE: 79 BPM

## 2019-02-15 PROBLEM — G93.40 ENCEPHALOPATHY ACUTE: Status: RESOLVED | Noted: 2019-02-11 | Resolved: 2019-02-15

## 2019-02-15 LAB
ALBUMIN SERPL BCP-MCNC: 2 G/DL
ALP SERPL-CCNC: 78 U/L
ALT SERPL W/O P-5'-P-CCNC: 12 U/L
ANION GAP SERPL CALC-SCNC: 9 MMOL/L
ANISOCYTOSIS BLD QL SMEAR: SLIGHT
AST SERPL-CCNC: 16 U/L
BASOPHILS # BLD AUTO: ABNORMAL K/UL
BASOPHILS NFR BLD: 1 %
BILIRUB SERPL-MCNC: 0.7 MG/DL
BUN SERPL-MCNC: 62 MG/DL
CALCIUM SERPL-MCNC: 8.2 MG/DL
CHLORIDE SERPL-SCNC: 113 MMOL/L
CO2 SERPL-SCNC: 16 MMOL/L
CREAT SERPL-MCNC: 2.3 MG/DL
DIFFERENTIAL METHOD: ABNORMAL
EOSINOPHIL # BLD AUTO: ABNORMAL K/UL
EOSINOPHIL NFR BLD: 4 %
ERYTHROCYTE [DISTWIDTH] IN BLOOD BY AUTOMATED COUNT: 15.9 %
EST. GFR  (AFRICAN AMERICAN): 33 ML/MIN/1.73 M^2
EST. GFR  (NON AFRICAN AMERICAN): 28.5 ML/MIN/1.73 M^2
GLUCOSE SERPL-MCNC: 330 MG/DL
HCT VFR BLD AUTO: 31.9 %
HGB BLD-MCNC: 10 G/DL
IMM GRANULOCYTES # BLD AUTO: ABNORMAL K/UL
IMM GRANULOCYTES NFR BLD AUTO: ABNORMAL %
LYMPHOCYTES # BLD AUTO: ABNORMAL K/UL
LYMPHOCYTES NFR BLD: 4 %
MAGNESIUM SERPL-MCNC: 1.7 MG/DL
MCH RBC QN AUTO: 28.7 PG
MCHC RBC AUTO-ENTMCNC: 31.3 G/DL
MCV RBC AUTO: 92 FL
MONOCYTES # BLD AUTO: ABNORMAL K/UL
MONOCYTES NFR BLD: 4 %
NEUTROPHILS NFR BLD: 87 %
NRBC BLD-RTO: 0 /100 WBC
OVALOCYTES BLD QL SMEAR: ABNORMAL
PHOSPHATE SERPL-MCNC: 3.1 MG/DL
PLATELET # BLD AUTO: 106 K/UL
PLATELET BLD QL SMEAR: ABNORMAL
PMV BLD AUTO: 12.7 FL
POCT GLUCOSE: 294 MG/DL (ref 70–110)
POIKILOCYTOSIS BLD QL SMEAR: SLIGHT
POTASSIUM SERPL-SCNC: 4.4 MMOL/L
PROT SERPL-MCNC: 5 G/DL
RBC # BLD AUTO: 3.48 M/UL
SODIUM SERPL-SCNC: 138 MMOL/L
WBC # BLD AUTO: 9.83 K/UL

## 2019-02-15 PROCEDURE — 99238 PR HOSPITAL DISCHARGE DAY,<30 MIN: ICD-10-PCS | Mod: ,,, | Performed by: INTERNAL MEDICINE

## 2019-02-15 PROCEDURE — 83735 ASSAY OF MAGNESIUM: CPT

## 2019-02-15 PROCEDURE — 97530 THERAPEUTIC ACTIVITIES: CPT

## 2019-02-15 PROCEDURE — 85007 BL SMEAR W/DIFF WBC COUNT: CPT

## 2019-02-15 PROCEDURE — 63600175 PHARM REV CODE 636 W HCPCS: Performed by: PSYCHIATRY & NEUROLOGY

## 2019-02-15 PROCEDURE — 25000003 PHARM REV CODE 250: Performed by: INTERNAL MEDICINE

## 2019-02-15 PROCEDURE — 99238 HOSP IP/OBS DSCHRG MGMT 30/<: CPT | Mod: ,,, | Performed by: INTERNAL MEDICINE

## 2019-02-15 PROCEDURE — 84100 ASSAY OF PHOSPHORUS: CPT

## 2019-02-15 PROCEDURE — 80053 COMPREHEN METABOLIC PANEL: CPT

## 2019-02-15 PROCEDURE — 25000003 PHARM REV CODE 250: Performed by: PSYCHIATRY & NEUROLOGY

## 2019-02-15 PROCEDURE — 85027 COMPLETE CBC AUTOMATED: CPT

## 2019-02-15 PROCEDURE — 36415 COLL VENOUS BLD VENIPUNCTURE: CPT

## 2019-02-15 PROCEDURE — 25000003 PHARM REV CODE 250: Performed by: NURSE PRACTITIONER

## 2019-02-15 PROCEDURE — 97535 SELF CARE MNGMENT TRAINING: CPT

## 2019-02-15 PROCEDURE — 25000003 PHARM REV CODE 250: Performed by: PHYSICIAN ASSISTANT

## 2019-02-15 RX ORDER — CLONIDINE HYDROCHLORIDE 0.3 MG/1
0.3 TABLET ORAL 3 TIMES DAILY
Qty: 90 TABLET | Refills: 0 | Status: SHIPPED | OUTPATIENT
Start: 2019-02-15 | End: 2019-03-17

## 2019-02-15 RX ORDER — ASPIRIN 325 MG
325 TABLET ORAL DAILY
Qty: 30 TABLET | Refills: 0 | Status: SHIPPED | OUTPATIENT
Start: 2019-02-15 | End: 2019-03-17

## 2019-02-15 RX ORDER — NIFEDIPINE 30 MG/1
30 TABLET, EXTENDED RELEASE ORAL DAILY
Qty: 30 TABLET | Refills: 0 | Status: SHIPPED | OUTPATIENT
Start: 2019-02-15 | End: 2019-03-17

## 2019-02-15 RX ORDER — SIMVASTATIN 20 MG/1
20 TABLET, FILM COATED ORAL DAILY
Qty: 30 TABLET | Refills: 0 | Status: SHIPPED | OUTPATIENT
Start: 2019-02-15 | End: 2019-03-17

## 2019-02-15 RX ORDER — CARVEDILOL 25 MG/1
25 TABLET ORAL 2 TIMES DAILY
Qty: 60 TABLET | Refills: 0 | Status: SHIPPED | OUTPATIENT
Start: 2019-02-15 | End: 2019-03-17

## 2019-02-15 RX ADMIN — HEPARIN SODIUM 5000 UNITS: 5000 INJECTION, SOLUTION INTRAVENOUS; SUBCUTANEOUS at 05:02

## 2019-02-15 RX ADMIN — STANDARDIZED SENNA CONCENTRATE AND DOCUSATE SODIUM 1 TABLET: 8.6; 5 TABLET, FILM COATED ORAL at 08:02

## 2019-02-15 RX ADMIN — ALLOPURINOL 200 MG: 100 TABLET ORAL at 08:02

## 2019-02-15 RX ADMIN — NIFEDIPINE 30 MG: 30 TABLET, FILM COATED, EXTENDED RELEASE ORAL at 09:02

## 2019-02-15 RX ADMIN — CLONIDINE HYDROCHLORIDE 0.3 MG: 0.3 TABLET ORAL at 09:02

## 2019-02-15 RX ADMIN — CARVEDILOL 25 MG: 25 TABLET, FILM COATED ORAL at 08:02

## 2019-02-15 RX ADMIN — CEFAZOLIN SODIUM 2 G: 2 SOLUTION INTRAVENOUS at 09:02

## 2019-02-15 RX ADMIN — INSULIN ASPART 6 UNITS: 100 INJECTION, SOLUTION INTRAVENOUS; SUBCUTANEOUS at 08:02

## 2019-02-15 RX ADMIN — SODIUM BICARBONATE 650 MG TABLET 650 MG: at 08:02

## 2019-02-15 RX ADMIN — ATORVASTATIN CALCIUM 40 MG: 20 TABLET, FILM COATED ORAL at 08:02

## 2019-02-15 RX ADMIN — ASPIRIN 325 MG ORAL TABLET 325 MG: 325 PILL ORAL at 08:02

## 2019-02-15 NOTE — PLAN OF CARE
Problem: Occupational Therapy Goal  Goal: Occupational Therapy Goal  Goals to be met by: 7 days (2/20/19)     Patient will increase functional independence with ADLs by performing:    UE Dressing with Supervision. - not met  LE Dressing with Supervision. - not met  Grooming while standing at sink with Supervision. - not met  Toileting from toilet with Supervision for hygiene and clothing management. - not met  Supine to sit with Modified [place]. - not met  Toilet transfer to toilet with Supervision. - not met  Pt will complete functional mobility household distance with SBA using AD as needed. - met     Outcome: Outcome(s) achieved Date Met: 02/15/19  Goals partially met.  Hospital discharge.

## 2019-02-15 NOTE — TELEPHONE ENCOUNTER
"  Reason for Disposition   Caller has URGENT medication question about med that PCP prescribed and triager unable to answer question    Answer Assessment - Initial Assessment Questions  1. SYMPTOMS: "Do you have any symptoms?"      n/a  2. SEVERITY: If symptoms are present, ask "Are they mild, moderate or severe?"      n/a    Protocols used: ST MEDICATION QUESTION CALL-A-    Patient calling stating he was discharged from the hospital today and thinks he should have had a rx for an antibiotic. Patient transferred to floor to speak with charge nurse. Please contact caller directly to discuss any further care advice.  "

## 2019-02-15 NOTE — HOSPITAL COURSE
He was admitted to [facility] after being diagnosed with PRES, likely due to uncontrolled hypertension. He was intubated after suspected aspiration and started on empiric antibiotics. Respiratory cultures grew E. Coli so antibiotics were de-escalated to cefazolin. He was successfully extubated to room air. He was restarted on home BP medications but BP remained elevated. He was stepped down to hospital medicine service on 2/14 where he was started on nifedipine which helped improved BP. He was evaluated by PT/OT and recommended for [facility]. He received 5 days total of antibiotics for aspiration. He is doing well and stable for discharge home today.

## 2019-02-15 NOTE — PLAN OF CARE
[facility] ICU Acceptance Note    Date of Admit: 2/11/2019  Date of Transfer / Stepdown: 2/14/2019  Bounceblavonnes, C/J, L, Onc (IV chemo w/in 1 month), Gyn/Onc, or other special case?: no   ICU team stepping patient down: MICU  ICU team member giving verbal handoff: Amelia Santos  Accepting  team: MOODY    Brief History of Present Illness:      Mr Leach is a 67 yo male with a PMH of Liver disease, DM, CAD, HTN, R eye blindness, and facto VIII defecit who presents to [facility] for AMS. He was at home yesterday when he stood up to go to the bathroom and on the way vomited and became confused. EMS arrived to find the pt confused with SBP >200. He had a R gaze and L sided weakness. . CTA at OSH showed no flow limiting stenosis or LVO. He was unresponsive upon arrival to OSH and was intubated in ED. He is being admitted to [facility] for a higher level of care.       Hospital/ICU Course:     2/11: Admit NCC   2/12: extubation, off insulin gtt  2/13: Tolerating RA, narrow ABX, TTF under HM   2/14: HTN overnight, oral meds adjusted. TTF under HM       Consultants and Procedures:     Consultants:  Vascular neurology    Procedures:    n/a    Transfer Information:     Diet:  Renal    Physical Activity:  Up with assistance      To Do / Pending Studies / Follow ups:  Working on blood pressure control    Patient has been accepted by Hospital Medicine Team A, who will assume care of the patient upon arrival to the floor from the ICU. Please contact ICU team with any concerns prior to arrival. Please contact [facility] at [phone] or [phone] (please do NOT leave a voicemail) when patient arrives to the floor.    Kimberley Cristobal MD  Department of Hospital Medicine  [facility]  (pager) [phone]

## 2019-02-15 NOTE — NURSING
Nurse notified MD regarding patient's elevated BP. Patient given one time dose of Procardia at approximately 440pm. Bp remained elevated. Pt then given prn dose of Labetalol. Lowest BP reading 161/74. BP went to 180/80's. Md notified. MD stated it was ok as long as patient is asymptomatic. Pt showed no symptoms of distress or discomfort

## 2019-02-15 NOTE — HPI
Mr Leach is a 67 yo male with a PMH of Liver disease, DM, CAD, HTN, R eye blindness, and facto VIII defecit who presents to [facility] for AMS. He was at home yesterday when he stood up to go to the bathroom and on the way vomited and became confused. EMS arrived to find the pt confused with SBP >200. He had a R gaze and L sided weakness. . CTA at OSH showed no flow limiting stenosis or LVO. He was unresponsive upon arrival to OSH and was intubated in ED. He is being admitted to [facility] for a higher level of care.

## 2019-02-15 NOTE — PLAN OF CARE
Discharge Planning:    Sw met with patient and son.  Patient selected [facility] as his HH provider.    Sw called [facility]  [phone] and faxed patients info to [phone].      Bhavesh will continue to follow.  JASON Lin,LCSW

## 2019-02-15 NOTE — DISCHARGE SUMMARY
[facility] Hospital Medicine  Discharge Summary      Patient Name: Michele Leach  MRN: [id number]  Admission Date: 2/11/2019  Hospital Length of Stay: 4 days  Discharge Date and Time:  02/15/2019 3:00 PM  Attending Physician: No att. providers found   Discharging Provider: Kimberley Cristobal MD  Primary Care Provider: Lucian Vásquez Iii, MD  [facility] Medicine Team: [facility] MED A Kimberley Cristobal MD    HPI:   Mr Leach is a 67 yo male with a PMH of Liver disease, DM, CAD, HTN, R eye blindness, and facto VIII defecit who presents to [facility] for AMS. He was at home yesterday when he stood up to go to the bathroom and on the way vomited and became confused. EMS arrived to find the pt confused with SBP >200. He had a R gaze and L sided weakness. . CTA at OSH showed no flow limiting stenosis or LVO. He was unresponsive upon arrival to OSH and was intubated in ED. He is being admitted to [facility] for a higher level of care.         * No surgery found *      Hospital Course:   He was admitted to [facility] after being diagnosed with PRES, likely due to uncontrolled hypertension. He was intubated after suspected aspiration and started on empiric antibiotics. Respiratory cultures grew E. Coli so antibiotics were de-escalated to cefazolin. He was successfully extubated to room air. He was restarted on home BP medications but BP remained elevated. He was stepped down to hospital medicine service on 2/14 where he was started on nifedipine which helped improved BP. He was evaluated by PT/OT and recommended for C. He received 5 days total of antibiotics for aspiration. He is doing well and stable for discharge home today.     Consults:   Consults (From admission, onward)        Status Ordering Provider     Inpatient consult to PICC team (NIAS)  Once     Provider:  (Not yet assigned)    ZEESHAN Chakraborty     Inpatient consult to Registered Dietitian/Nutritionist  Once     Provider:  (Not yet assigned)     Completed JOSELINE OVIEDO     Inpatient consult to Social Work/Case Management  Once     Provider:  (Not yet assigned)    Acknowledged JOSELINE OVIEDO     Inpatient consult to Vascular (Stroke) Neurology  Once     Provider:  (Not yet assigned)    Completed JOSELINE OVIEDO          No new Assessment & Plan notes have been filed under this hospital service since the last note was generated.  Service: Hospital Medicine    Final Active Diagnoses:    Diagnosis Date Noted POA    PRINCIPAL PROBLEM:  Posterior reversible encephalopathy syndrome [I67.83] 02/11/2019 Yes    PRES (posterior reversible encephalopathy syndrome) [I67.83] 02/14/2019 Yes    Stage 4 chronic kidney disease [N18.4] 02/12/2019 Yes    Type 2 diabetes mellitus with hyperglycemia, without long-term current use of insulin [E11.65] 02/11/2019 Yes    Essential hypertension [I10] 02/11/2019 Yes    Liver failure [K72.90] 02/11/2019 Yes    Aspiration into respiratory tract [T17.908A] 02/11/2019 Yes    Vasogenic brain edema [G93.6] 02/11/2019 Yes      Problems Resolved During this Admission:    Diagnosis Date Noted Date Resolved POA    Encephalopathy acute [G93.40] 02/11/2019 02/15/2019 Yes    Acute respiratory failure with hypoxia [J96.01] 02/11/2019 02/12/2019 Yes       Discharged Condition: good    [place] [facility]    Follow Up:  Follow-up Information     Lucian Vásquez Iii, MD In 1 week.    Specialty:  Family Medicine  Why:  follow-up on blood pressure  Contact information:  [address] [id number] [phone]                 Patient Instructions:      Diet diabetic     Activity as tolerated       Significant Diagnostic Studies: Labs:   BMP:   Recent Labs   Lab 02/14/19  0432 02/15/19  0714   * 330*    138   K 4.4 4.4   * 113*   CO2 15* 16*   BUN 56* 62*   CREATININE 2.4* 2.3*   CALCIUM 8.2* 8.2*   MG 1.8 1.7   , CBC   Recent Labs   Lab 02/14/19  0432 02/15/19  0714   WBC 15.00* 9.83    HGB 10.8* 10.0*   HCT 33.5* 31.9*   * 106*    and All labs within the past 24 hours have been reviewed    Pending Diagnostic Studies:     None         Medications:  Reconciled Home Medications:      Medication List      START taking these medications    aspirin 325 MG tablet  Take 1 tablet (325 mg total) by mouth once daily.  Replaces:  aspirin 81 MG EC tablet     NIFEdipine 30 MG (OSM) 24 hr tablet  Commonly known as:  PROCARDIA-XL  Take 1 tablet (30 mg total) by mouth once daily.        CHANGE how you take these medications    carvedilol 25 MG tablet  Commonly known as:  COREG  Take 1 tablet (25 mg total) by mouth 2 (two) times daily.  What changed:    · medication strength  · how much to take     cloNIDine 0.3 MG tablet  Commonly known as:  CATAPRES  Take 1 tablet (0.3 mg total) by mouth 3 (three) times daily.  What changed:    · medication strength  · how much to take  · when to take this        CONTINUE taking these medications    allopurinol 300 MG tablet  Commonly known as:  ZYLOPRIM  Take 300 mg by mouth once daily.     calcitRIOL 0.25 MCG Cap  Commonly known as:  ROCALTROL  Take 0.25 mcg by mouth once daily.     cyclobenzaprine 10 MG tablet  Commonly known as:  FLEXERIL  Take 10 mg by mouth 3 (three) times daily as needed for Muscle spasms.     fluticasone 50 mcg/actuation nasal spray  Commonly known as:  FLONASE  1 spray by Each Nare route once daily.     pioglitazone-glimepiride 30-4 mg per tablet  Commonly known as:  DUETACT  Take 1 tablet by mouth 2 (two) times daily.     simvastatin 20 MG tablet  Commonly known as:  ZOCOR  Take 1 tablet (20 mg total) by mouth once daily.     sodium bicarbonate 650 MG tablet  Take 650 mg by mouth 3 (three) times daily.        STOP taking these medications    aspirin 81 MG EC tablet  Commonly known as:  ECOTRIN  Replaced by:  aspirin 325 MG tablet            Indwelling Lines/Drains at time of discharge:   Lines/Drains/Airways          None          Time spent on  the discharge of patient: 30 minutes  Patient was seen and examined on the date of discharge and determined to be suitable for discharge.         Kimberley Cristobal MD  Department of Hospital Medicine  [facility]

## 2019-02-15 NOTE — PLAN OF CARE
Problem: Adult Inpatient Plan of Care  Goal: Plan of Care Review  Outcome: Ongoing (interventions implemented as appropriate)  Patient remained free of falls and injuries during shift.  Patient took medications without incident.  Blood pressure monitored per order.  No other complaints noted.

## 2019-02-15 NOTE — PLAN OF CARE
[facility]    HOME HEALTH ORDERS  FACE TO FACE ENCOUNTER    Patient Name: Michele Leach  YOB: 1950    PCP: Lucian Vásquez Iii, MD   PCP Address: [address] [address] / [place] [address]  PCP Phone Number: [phone]  PCP Fax: [phone]    Encounter Date: 02/15/2019    Admit to Home Health    Diagnoses:  Active Hospital Problems    Diagnosis  POA    *Posterior reversible encephalopathy syndrome [I67.83]  Yes    PRES (posterior reversible encephalopathy syndrome) [I67.83]  Yes    Stage 4 chronic kidney disease [N18.4]  Yes    Type 2 diabetes mellitus with hyperglycemia, without long-term current use of insulin [E11.65]  Yes    Essential hypertension [I10]  Yes    Liver failure [K72.90]  Yes    Aspiration into respiratory tract [T17.908A]  Yes    Vasogenic brain edema [G93.6]  Yes      Resolved Hospital Problems    Diagnosis Date Resolved POA    Encephalopathy acute [G93.40] 02/15/2019 Yes    Acute respiratory failure with hypoxia [J96.01] 02/12/2019 Yes       No future appointments.        I have seen and examined this patient face to face today. My clinical findings that support the need for the home health skilled services and home bound status are the following:  Weakness/numbness causing balance and gait disturbance due to Weakness/Debility making it taxing to leave home.  Requiring assistive device to leave home due to unsteady gait caused by  Weakness/Debility.  Medical restrictions requiring assistance of another human to leave home due to  Unstable ambulation.    Allergies:  Review of patient's allergies indicates:   Allergen Reactions    Codeine Other (See Comments)       Diet: diabetic diet: 2000 calorie    Activities: activity as tolerated    Nursing:   SN to complete comprehensive assessment including routine vital signs. Instruct on disease process and s/s of complications to report to MD. Review/verify medication list sent home with the  patient at time of discharge  and instruct patient/caregiver as needed. Frequency may be adjusted depending on start of care date.    Notify MD if SBP > 160 or < 90; DBP > 90 or < 50; HR > 120 or < 50; Temp > 101;       CONSULTS:    Physical Therapy to evaluate and treat. Evaluate for home safety and equipment needs; Establish/upgrade home exercise program. Perform / instruct on therapeutic exercises, gait training, transfer training, and Range of Motion.  Occupational Therapy to evaluate and treat. Evaluate home environment for safety and equipment needs. Perform/Instruct on transfers, ADL training, ROM, and therapeutic exercises.    MISCELLANEOUS CARE:  Diabetic Care:   SN to perform and educate Diabetic management with blood glucose monitoring: and Report CBG < 60 or > 350 to physician.    WOUND CARE ORDERS  n/a      Medications: Review discharge medications with patient and family and provide education.      Current Discharge Medication List      START taking these medications    Details   aspirin 325 MG tablet Take 1 tablet (325 mg total) by mouth once daily.  Qty: 30 tablet, Refills: 0      NIFEdipine (PROCARDIA-XL) 30 MG (OSM) 24 hr tablet Take 1 tablet (30 mg total) by mouth once daily.  Qty: 30 tablet, Refills: 0         CONTINUE these medications which have CHANGED    Details   carvedilol (COREG) 25 MG tablet Take 1 tablet (25 mg total) by mouth 2 (two) times daily.  Qty: 60 tablet, Refills: 0      cloNIDine (CATAPRES) 0.3 MG tablet Take 1 tablet (0.3 mg total) by mouth 3 (three) times daily.  Qty: 90 tablet, Refills: 0         CONTINUE these medications which have NOT CHANGED    Details   allopurinol (ZYLOPRIM) 300 MG tablet Take 300 mg by mouth once daily.      calcitRIOL (ROCALTROL) 0.25 MCG Cap Take 0.25 mcg by mouth once daily.      cyclobenzaprine (FLEXERIL) 10 MG tablet Take 10 mg by mouth 3 (three) times daily as needed for Muscle spasms.      fluticasone (FLONASE) 50 mcg/actuation nasal spray 1  spray by Each Nare route once daily.      pioglitazone-glimepiride (DUETACT) 30-4 mg per tablet Take 1 tablet by mouth 2 (two) times daily.      simvastatin (ZOCOR) 20 MG tablet Take 20 mg by mouth once daily.      sodium bicarbonate 650 MG tablet Take 650 mg by mouth 3 (three) times daily.         STOP taking these medications       aspirin (ECOTRIN) 81 MG EC tablet Comments:   Reason for Stopping:               I certify that this patient is confined to his home and needs intermittent skilled nursing care, physical therapy and occupational therapy.

## 2019-02-15 NOTE — PT/OT/SLP PROGRESS
Occupational Therapy   Treatment & Discharge Summary    Name: Michele Leach  MRN: [id number]  Admitting Diagnosis:  Posterior reversible encephalopathy syndrome       Recommendations:     Discharge Recommendations: (HH OT)  Discharge Equipment Recommendations:  walker, rolling      Assessment:     Michele Leach is a 68 y.o. male with a medical diagnosis of Posterior reversible encephalopathy syndrome.  He presents with good participation and motivation.  Pt discharged to home on this date therefore no further OT needed on acute.  Recommend continued therapy at home with HH as pt still with deficits & at risk for falls.     Rehab Prognosis:  Good; patient would benefit from acute skilled OT services to address these deficits and reach maximum level of function.       Plan:     Discharge OT due to hospital discharge. Continue OT with HH.  · Plan of Care Reviewed with: patient, son    Subjective     Pain/Comfort:  · Pain Rating 1: 0/10  · Pain Rating Post-Intervention 1: 0/10    Objective:     Communicated with: RN prior to session.  Patient found seated EOB with (no lines; son present in room) upon OT entry to room.    General Precautions: Standard, aspiration, vision impaired     Occupational Performance:     Functional Mobility/Transfers:  · Patient completed Sit <> Stand Transfer with stand by assistance  with  no assistive device   · Functional Mobility: SBA with functional mobility  In room & hallway for endurance & balance training using RW.  Provided cues due to decreased vision with pt occasionally tapping foot with walker leg on the right.    Activities of Daily Living:  · Upper Body Dressing: stand by assistance donning button down shirt while standing & Modified independent donning pull over shirt while seated EOB  · Lower Body Dressing: stand by assistance donning pants, socks, & shoes with standing in parts & sitting EOB in parts      [facility] 6 Click ADL: 19    Treatment & Education:  Provided education  regarding importance of initiating feet into pants while seated due to decreased balance in standing - pt & son verbalized understanding.  Provided education on safety, safe use of RW, need to slow down mobility, need for (S) with ambulation & activities in standing initially upon discharge home with pt & son verbalizing understanding.  Pt with 1 slight LOB during turn in standing donning clothing with pt able to recover balance safely (using furniture) with SBA.  Pt had no further questions & when asked whether there were any concerns pt reported none.    Patient left seated EOB with call button in reach, RN notified, son present and white board updated.Education:      GOALS:   Multidisciplinary Problems     Occupational Therapy Goals     Not on file          Multidisciplinary Problems (Resolved)        Problem: Occupational Therapy Goal    Goal Priority Disciplines Outcome Interventions   Occupational Therapy Goal   (Resolved)     OT, PT/OT Outcome(s) achieved    Description:  Goals to be met by: 7 days (2/20/19)     Patient will increase functional independence with ADLs by performing:    UE Dressing with Supervision. - not met  LE Dressing with Supervision. - not met  Grooming while standing at sink with Supervision. - not met  Toileting from toilet with Supervision for hygiene and clothing management. - not met  Supine to sit with Modified Drew. - not met  Toilet transfer to toilet with Supervision. - not met  Pt will complete functional mobility household distance with SBA using AD as needed. - met                       Time Tracking:     OT Date of Treatment: 02/15/19  OT Start Time: 1132  OT Stop Time: 1156  OT Total Time (min): 24 min    Billable Minutes:Self Care/Home Management 14  Therapeutic Activity 10    AMANDA Martínez  2/15/2019

## 2019-02-15 NOTE — PLAN OF CARE
Problem: Diabetes Comorbidity  Goal: Blood Glucose Level Within Desired Range    Intervention: Maintain Glycemic Control  Patient alert and orientated , follows commands , bed in low position , call light within reach and patient demonstrated proper usage. Bed locked with brake , Room clutter free and personal items with reach, addressed care plan for today , all questions answered and allow patient time for clarification. Medications and diet fluid balance  instructions with signs and symptoms and the importance to continue once discharged .Reviewed discharged , next  Follow up appointment. Continue Turjeta at home per MD instructions monitor blood sugar.

## 2019-02-15 NOTE — HPI
Mr Leach is a 67 yo male with a PMH of Liver disease, DM, CAD, HTN, R eye blindness, and facto VIII defecit who presents to [facility] for AMS. He was at home yesterday when he stood up to go to the bathroom and on the way vomited and became confused. EMS arrived to find the pt confused with SBP >200. He had a R gaze and L sided weakness. . CTA at OSH showed no flow limiting stenosis or LVO. He was unresponsive upon arrival to OSH and was intubated in ED. He is being admitted to [facility] for a higher level of care.

## 2019-02-16 LAB
BACTERIA BLD CULT: NORMAL
BACTERIA BLD CULT: NORMAL

## 2019-02-19 ENCOUNTER — NURSE TRIAGE (OUTPATIENT)
Dept: ADMINISTRATIVE | Facility: CLINIC | Age: 69
End: 2019-02-19

## 2019-02-19 NOTE — TELEPHONE ENCOUNTER
Reason for Disposition   Information only question and nurse able to answer    Protocols used: ST NO PROTOCOL AVAILABLE - INFORMATION ONLY-A-[place]    Pt returning [facility] call.

## 2020-02-20 ENCOUNTER — HISTORICAL (OUTPATIENT)
Dept: ADMINISTRATIVE | Facility: HOSPITAL | Age: 70
End: 2020-02-20

## 2020-02-20 LAB
ABS NEUT (OLG): 6.28 X10(3)/MCL (ref 2.1–9.2)
ALBUMIN SERPL-MCNC: 2.1 GM/DL (ref 3.4–5)
ALBUMIN/GLOB SERPL: 0.7 {RATIO}
ALP SERPL-CCNC: 113 UNIT/L (ref 50–136)
ALT SERPL-CCNC: 25 UNIT/L (ref 12–78)
APTT PPP: 47 SECOND(S) (ref 23.2–33.7)
AST SERPL-CCNC: 17 UNIT/L (ref 15–37)
BASOPHILS # BLD AUTO: 0.1 X10(3)/MCL (ref 0–0.2)
BASOPHILS NFR BLD AUTO: 1 %
BILIRUB SERPL-MCNC: 0.5 MG/DL (ref 0.2–1)
BILIRUBIN DIRECT+TOT PNL SERPL-MCNC: 0.2 MG/DL (ref 0–0.2)
BILIRUBIN DIRECT+TOT PNL SERPL-MCNC: 0.3 MG/DL (ref 0–0.8)
BUN SERPL-MCNC: 44 MG/DL (ref 7–18)
CALCIUM SERPL-MCNC: 7.8 MG/DL (ref 8.5–10.1)
CHLORIDE SERPL-SCNC: 113 MMOL/L (ref 98–107)
CO2 SERPL-SCNC: 23 MMOL/L (ref 21–32)
CREAT SERPL-MCNC: 1.98 MG/DL (ref 0.7–1.3)
EOSINOPHIL # BLD AUTO: 0.5 X10(3)/MCL (ref 0–0.9)
EOSINOPHIL NFR BLD AUTO: 5 %
ERYTHROCYTE [DISTWIDTH] IN BLOOD BY AUTOMATED COUNT: 16.6 % (ref 11.5–17)
GLOBULIN SER-MCNC: 3 GM/DL (ref 2.4–3.5)
GLUCOSE SERPL-MCNC: 332 MG/DL (ref 74–106)
GROUP & RH: NORMAL
HCT VFR BLD AUTO: 27.9 % (ref 42–52)
HGB BLD-MCNC: 8.1 GM/DL (ref 14–18)
INR PPP: 1.2 (ref 0–1.3)
LYMPHOCYTES # BLD AUTO: 0.8 X10(3)/MCL (ref 0.6–4.6)
LYMPHOCYTES NFR BLD AUTO: 9 %
MCH RBC QN AUTO: 25.6 PG (ref 27–31)
MCHC RBC AUTO-ENTMCNC: 29 GM/DL (ref 33–36)
MCV RBC AUTO: 88.3 FL (ref 80–94)
MONOCYTES # BLD AUTO: 1 X10(3)/MCL (ref 0.1–1.3)
MONOCYTES NFR BLD AUTO: 12 %
NEUTROPHILS # BLD AUTO: 6.28 X10(3)/MCL (ref 2.1–9.2)
NEUTROPHILS NFR BLD AUTO: 71 %
PLATELET # BLD AUTO: 179 X10(3)/MCL (ref 130–400)
PMV BLD AUTO: 11.7 FL (ref 9.4–12.4)
POTASSIUM SERPL-SCNC: 5.1 MMOL/L (ref 3.5–5.1)
PROT SERPL-MCNC: 5.1 GM/DL (ref 6.4–8.2)
PROTHROMBIN TIME: 15 SECOND(S) (ref 11.1–13.7)
RBC # BLD AUTO: 3.16 X10(6)/MCL (ref 4.7–6.1)
SODIUM SERPL-SCNC: 140 MMOL/L (ref 136–145)
WBC # SPEC AUTO: 8.8 X10(3)/MCL (ref 4.5–11.5)